# Patient Record
Sex: FEMALE | Race: OTHER | HISPANIC OR LATINO | URBAN - METROPOLITAN AREA
[De-identification: names, ages, dates, MRNs, and addresses within clinical notes are randomized per-mention and may not be internally consistent; named-entity substitution may affect disease eponyms.]

---

## 2017-11-02 ENCOUNTER — EMERGENCY (EMERGENCY)
Facility: HOSPITAL | Age: 47
LOS: 0 days | Discharge: HOME | End: 2017-11-02
Attending: EMERGENCY MEDICINE
Payer: COMMERCIAL

## 2017-11-02 VITALS
SYSTOLIC BLOOD PRESSURE: 124 MMHG | RESPIRATION RATE: 20 BRPM | HEART RATE: 85 BPM | DIASTOLIC BLOOD PRESSURE: 77 MMHG | OXYGEN SATURATION: 99 % | WEIGHT: 184.09 LBS | TEMPERATURE: 98 F | HEIGHT: 67 IN

## 2017-11-02 DIAGNOSIS — N83.201 UNSPECIFIED OVARIAN CYST, RIGHT SIDE: ICD-10-CM

## 2017-11-02 DIAGNOSIS — Z90.721 ACQUIRED ABSENCE OF OVARIES, UNILATERAL: Chronic | ICD-10-CM

## 2017-11-02 DIAGNOSIS — R10.9 UNSPECIFIED ABDOMINAL PAIN: ICD-10-CM

## 2017-11-02 LAB
ALBUMIN SERPL ELPH-MCNC: 3.7 G/DL — SIGNIFICANT CHANGE UP (ref 3.3–5)
ALP SERPL-CCNC: 77 U/L — SIGNIFICANT CHANGE UP (ref 40–120)
ALT FLD-CCNC: 27 U/L — SIGNIFICANT CHANGE UP (ref 12–78)
ANION GAP SERPL CALC-SCNC: 7 MMOL/L — SIGNIFICANT CHANGE UP (ref 5–17)
APPEARANCE UR: CLEAR — SIGNIFICANT CHANGE UP
AST SERPL-CCNC: 12 U/L — LOW (ref 15–37)
BACTERIA # UR AUTO: ABNORMAL
BASOPHILS # BLD AUTO: 0.1 K/UL — SIGNIFICANT CHANGE UP (ref 0–0.2)
BASOPHILS NFR BLD AUTO: 0.9 % — SIGNIFICANT CHANGE UP (ref 0–2)
BILIRUB SERPL-MCNC: 0.5 MG/DL — SIGNIFICANT CHANGE UP (ref 0.2–1.2)
BILIRUB UR-MCNC: NEGATIVE — SIGNIFICANT CHANGE UP
BUN SERPL-MCNC: 14 MG/DL — SIGNIFICANT CHANGE UP (ref 7–23)
CALCIUM SERPL-MCNC: 8.6 MG/DL — SIGNIFICANT CHANGE UP (ref 8.5–10.1)
CHLORIDE SERPL-SCNC: 106 MMOL/L — SIGNIFICANT CHANGE UP (ref 96–108)
CO2 SERPL-SCNC: 27 MMOL/L — SIGNIFICANT CHANGE UP (ref 22–31)
COLOR SPEC: YELLOW — SIGNIFICANT CHANGE UP
CREAT SERPL-MCNC: 0.97 MG/DL — SIGNIFICANT CHANGE UP (ref 0.5–1.3)
DIFF PNL FLD: ABNORMAL
EOSINOPHIL # BLD AUTO: 0.1 K/UL — SIGNIFICANT CHANGE UP (ref 0–0.5)
EOSINOPHIL NFR BLD AUTO: 1.1 % — SIGNIFICANT CHANGE UP (ref 0–6)
EPI CELLS # UR: SIGNIFICANT CHANGE UP
GLUCOSE SERPL-MCNC: 105 MG/DL — HIGH (ref 70–99)
GLUCOSE UR QL: NEGATIVE MG/DL — SIGNIFICANT CHANGE UP
HCG SERPL-ACNC: <1 MIU/ML — SIGNIFICANT CHANGE UP
HCT VFR BLD CALC: 38 % — SIGNIFICANT CHANGE UP (ref 34.5–45)
HGB BLD-MCNC: 12.8 G/DL — SIGNIFICANT CHANGE UP (ref 11.5–15.5)
KETONES UR-MCNC: NEGATIVE — SIGNIFICANT CHANGE UP
LEUKOCYTE ESTERASE UR-ACNC: ABNORMAL
LYMPHOCYTES # BLD AUTO: 2.3 K/UL — SIGNIFICANT CHANGE UP (ref 1–3.3)
LYMPHOCYTES # BLD AUTO: 22.1 % — SIGNIFICANT CHANGE UP (ref 13–44)
MCHC RBC-ENTMCNC: 30 PG — SIGNIFICANT CHANGE UP (ref 27–34)
MCHC RBC-ENTMCNC: 33.7 GM/DL — SIGNIFICANT CHANGE UP (ref 32–36)
MCV RBC AUTO: 89.1 FL — SIGNIFICANT CHANGE UP (ref 80–100)
MONOCYTES # BLD AUTO: 0.7 K/UL — SIGNIFICANT CHANGE UP (ref 0–0.9)
MONOCYTES NFR BLD AUTO: 7 % — SIGNIFICANT CHANGE UP (ref 2–14)
NEUTROPHILS # BLD AUTO: 7.1 K/UL — SIGNIFICANT CHANGE UP (ref 1.8–7.4)
NEUTROPHILS NFR BLD AUTO: 69 % — SIGNIFICANT CHANGE UP (ref 43–77)
NITRITE UR-MCNC: NEGATIVE — SIGNIFICANT CHANGE UP
PH UR: 7 — SIGNIFICANT CHANGE UP (ref 5–8)
PLATELET # BLD AUTO: 210 K/UL — SIGNIFICANT CHANGE UP (ref 150–400)
POTASSIUM SERPL-MCNC: 3.8 MMOL/L — SIGNIFICANT CHANGE UP (ref 3.5–5.3)
POTASSIUM SERPL-SCNC: 3.8 MMOL/L — SIGNIFICANT CHANGE UP (ref 3.5–5.3)
PROT SERPL-MCNC: 7.6 GM/DL — SIGNIFICANT CHANGE UP (ref 6–8.3)
PROT UR-MCNC: NEGATIVE MG/DL — SIGNIFICANT CHANGE UP
RBC # BLD: 4.26 M/UL — SIGNIFICANT CHANGE UP (ref 3.8–5.2)
RBC # FLD: 13.2 % — SIGNIFICANT CHANGE UP (ref 11–15)
SODIUM SERPL-SCNC: 140 MMOL/L — SIGNIFICANT CHANGE UP (ref 135–145)
SP GR SPEC: 1.01 — SIGNIFICANT CHANGE UP (ref 1.01–1.02)
UROBILINOGEN FLD QL: NEGATIVE MG/DL — SIGNIFICANT CHANGE UP
WBC # BLD: 10.4 K/UL — SIGNIFICANT CHANGE UP (ref 3.8–10.5)
WBC # FLD AUTO: 10.4 K/UL — SIGNIFICANT CHANGE UP (ref 3.8–10.5)
WBC UR QL: SIGNIFICANT CHANGE UP

## 2017-11-02 PROCEDURE — 74177 CT ABD & PELVIS W/CONTRAST: CPT | Mod: 26

## 2017-11-02 PROCEDURE — 76856 US EXAM PELVIC COMPLETE: CPT | Mod: 26

## 2017-11-02 PROCEDURE — 99285 EMERGENCY DEPT VISIT HI MDM: CPT | Mod: 25

## 2017-11-02 RX ORDER — SODIUM CHLORIDE 9 MG/ML
1000 INJECTION INTRAMUSCULAR; INTRAVENOUS; SUBCUTANEOUS ONCE
Qty: 0 | Refills: 0 | Status: COMPLETED | OUTPATIENT
Start: 2017-11-02 | End: 2017-11-02

## 2017-11-02 RX ORDER — KETOROLAC TROMETHAMINE 30 MG/ML
30 SYRINGE (ML) INJECTION ONCE
Qty: 0 | Refills: 0 | Status: DISCONTINUED | OUTPATIENT
Start: 2017-11-02 | End: 2017-11-02

## 2017-11-02 RX ADMIN — Medication 30 MILLIGRAM(S): at 01:30

## 2017-11-02 RX ADMIN — SODIUM CHLORIDE 500 MILLILITER(S): 9 INJECTION INTRAMUSCULAR; INTRAVENOUS; SUBCUTANEOUS at 01:22

## 2017-11-02 NOTE — ED ADULT TRIAGE NOTE - CHIEF COMPLAINT QUOTE
lower back pain radiating down R-leg with lower abd pain since 5pm, denies pain on urination/frequency.  c/o nausea.  denies V/D

## 2017-11-02 NOTE — ED PROVIDER NOTE - PHYSICAL EXAMINATION
GEN: Alert, NAD  HEAD: atraumatic, EOMI, PERRL, normal lids/conjunctiva  ENT: normal hearing, patent oropharynx without erythema/exudate, uvula midline  NECK: +supple, no tenderness/meningismus, +Trachea midline  PULM: Bilateral BS, normal resp effort, no wheeze/stridor/retractions  CV: RRR, no M/R/G, +dist ext pulses  ABD: soft, NT/ND, mild lower abd pain, no rebround  gyn exam - pt deferred  BACK: no CVAT, no midline tenderness  MSK: no edema/erythema/cyanosis  SKIN: no rash  NEURO: AAOx3, no sensory/motor deficits, CN 2-12 intact

## 2017-11-02 NOTE — ED PROVIDER NOTE - PRESENTING SYMPTOMS DETAILS
46F w L oopherectomy coems in w lower abd pain since today after she got up from sitting, mild nausea, no v/d/flankpain/dysuria/discharge, constant, no radiation  ROS: No fever/chills, No photophobia/eye pain/changes in vision, No ear pain/sore throat/dysphagia, No chest pain/palpitations, no SOB/cough/wheeze/stridor, No N/V/D, no dysuria/frequency/discharge, No neck/back pain, no rash, no changes in neurological status/function.

## 2017-11-02 NOTE — ED ADULT NURSE NOTE - OBJECTIVE STATEMENT
Pt states she has RLQ pain since this AM.  Pt c/o nausea without vomiting or diarrhea.  Pain worsens when ambulating.  Bowel soft but tender to touch in RLQ area.

## 2017-11-02 NOTE — ED PROVIDER NOTE - MEDICAL DECISION MAKING DETAILS
pt pain improved. CT/US shows likely ov cyst .pt feels comfortable f/u w ob/gyn. pt appears well and non-toxic. . VSS. Sx have improved during ED stay. No acute events during ED observation period. Precautions given to return to the ED if sx persist or change. Pt expresses understanding and has no further questions. Pt feels comfortable and wishes to be discharged home. Instructed to follow up with PMD in 24 hrs. ob/gyn f/u

## 2017-11-03 LAB
CULTURE RESULTS: SIGNIFICANT CHANGE UP
SPECIMEN SOURCE: SIGNIFICANT CHANGE UP

## 2020-01-16 ENCOUNTER — EMERGENCY (EMERGENCY)
Facility: HOSPITAL | Age: 50
LOS: 0 days | Discharge: HOME | End: 2020-01-16
Attending: EMERGENCY MEDICINE
Payer: MEDICAID

## 2020-01-16 VITALS
TEMPERATURE: 98 F | WEIGHT: 169.09 LBS | DIASTOLIC BLOOD PRESSURE: 90 MMHG | HEIGHT: 67 IN | OXYGEN SATURATION: 99 % | SYSTOLIC BLOOD PRESSURE: 137 MMHG | RESPIRATION RATE: 80 BRPM | HEART RATE: 64 BPM

## 2020-01-16 DIAGNOSIS — R05 COUGH: ICD-10-CM

## 2020-01-16 DIAGNOSIS — J06.9 ACUTE UPPER RESPIRATORY INFECTION, UNSPECIFIED: ICD-10-CM

## 2020-01-16 DIAGNOSIS — Z90.721 ACQUIRED ABSENCE OF OVARIES, UNILATERAL: Chronic | ICD-10-CM

## 2020-01-16 PROCEDURE — 71045 X-RAY EXAM CHEST 1 VIEW: CPT | Mod: 26

## 2020-01-16 PROCEDURE — 99283 EMERGENCY DEPT VISIT LOW MDM: CPT

## 2020-01-16 RX ADMIN — Medication 100 MILLIGRAM(S): at 16:12

## 2020-01-16 NOTE — ED PROVIDER NOTE - PATIENT PORTAL LINK FT
You can access the FollowMyHealth Patient Portal offered by [facility] by registering at the following website: [email or web address]. By joining [employer]’s FollowMyHealth portal, you will also be able to view your health information using other applications (apps) compatible with our system.

## 2020-01-16 NOTE — ED PROVIDER NOTE - CLINICAL SUMMARY MEDICAL DECISION MAKING FREE TEXT BOX
DDX:: bronchitis, rule out pneumonia, chest wall pain, no risk factors for ACS, Heart score- 0    PLAN: Chest XRAY, EKG, test tessalon perles, and reassess.

## 2020-01-16 NOTE — ED PROVIDER NOTE - OBJECTIVE STATEMENT
48 y/o F with no pertinent pmhx presents with non productive Cough since last night, and CP with onset today. Pt reports n/v, sore throat, dyspnea. weakness, and HA. Pt denies fevers, rhinorrhea,  abdominal pain, or general myalgia.

## 2020-01-16 NOTE — ED ADULT NURSE NOTE - NSIMPLEMENTINTERV_GEN_ALL_ED
Implemented All Universal Safety Interventions:  [place]eche to call system. Call bell, personal items and telephone within reach. Instruct patient to call for assistance. Room bathroom lighting operational. Non-slip footwear when patient is off stretcher. Physically safe environment: no spills, clutter or unnecessary equipment. Stretcher in lowest position, wheels locked, appropriate side rails in place.

## 2020-01-16 NOTE — ED ADULT NURSE NOTE - OBJECTIVE STATEMENT
c/o cough x 1 week worse over past 8days with chest discomfort, rates coughing causes 3/10 chest discomfort

## 2020-01-20 ENCOUNTER — EMERGENCY (EMERGENCY)
Facility: HOSPITAL | Age: 50
LOS: 0 days | Discharge: HOME | End: 2020-01-20
Payer: MEDICAID

## 2020-01-20 VITALS
HEART RATE: 90 BPM | TEMPERATURE: 100 F | DIASTOLIC BLOOD PRESSURE: 68 MMHG | SYSTOLIC BLOOD PRESSURE: 130 MMHG | WEIGHT: 158.07 LBS | OXYGEN SATURATION: 100 % | RESPIRATION RATE: 17 BRPM | HEIGHT: 67 IN

## 2020-01-20 DIAGNOSIS — J40 BRONCHITIS, NOT SPECIFIED AS ACUTE OR CHRONIC: ICD-10-CM

## 2020-01-20 DIAGNOSIS — R50.9 FEVER, UNSPECIFIED: ICD-10-CM

## 2020-01-20 DIAGNOSIS — Z90.721 ACQUIRED ABSENCE OF OVARIES, UNILATERAL: Chronic | ICD-10-CM

## 2020-01-20 DIAGNOSIS — R05 COUGH: ICD-10-CM

## 2020-01-20 PROCEDURE — 99283 EMERGENCY DEPT VISIT LOW MDM: CPT

## 2020-01-20 RX ORDER — AZITHROMYCIN 500 MG/1
500 TABLET, FILM COATED ORAL ONCE
Refills: 0 | Status: COMPLETED | OUTPATIENT
Start: 2020-01-20 | End: 2020-01-20

## 2020-01-20 RX ORDER — AZITHROMYCIN 500 MG/1
1 TABLET, FILM COATED ORAL
Qty: 3 | Refills: 0
Start: 2020-01-20 | End: 2020-01-22

## 2020-01-20 RX ADMIN — AZITHROMYCIN 500 MILLIGRAM(S): 500 TABLET, FILM COATED ORAL at 22:00

## 2020-01-20 NOTE — ED ADULT NURSE NOTE - CAS EDN DISCHARGE ASSESSMENT
Dressing clean and dry/Alert and oriented to person, place and time/Awake/No adverse reaction to first time med in ED

## 2020-01-22 NOTE — ED PROVIDER NOTE - OBJECTIVE STATEMENT
48 yo female with no PMHx presenting to ED with complaints of cough for one week. Patient was seen in ED and given Tessalon perles and CXR last week but states cough is getting worse. Endorses waxing and waning fevers and malaise. Patient denies recent travel, or sick contacts.

## 2020-01-22 NOTE — ED PROVIDER NOTE - PATIENT PORTAL LINK FT
You can access the FollowMyHealth Patient Portal offered by [facility] by registering at the following website: [email or web address]. By joining [employer]’s FollowMyHealth portal, you will also be able to view your health information using other applications (apps) compatible with our system.

## 2020-01-22 NOTE — ED PROVIDER NOTE - PHYSICAL EXAMINATION
Lungs have scattered ronchi, no wheezing, no increased work of breathing,   Throat is clear no exudates, uvula midline no cervical lymphadenopathy.

## 2020-01-22 NOTE — ED PROVIDER NOTE - CLINICAL SUMMARY MEDICAL DECISION MAKING FREE TEXT BOX
based on exam and history likely bronchitis will not repeat CXR as patient had one 2 days prior, will give ABX.

## 2020-03-22 ENCOUNTER — EMERGENCY (EMERGENCY)
Facility: HOSPITAL | Age: 50
LOS: 0 days | Discharge: HOME | End: 2020-03-23
Attending: EMERGENCY MEDICINE
Payer: MEDICAID

## 2020-03-22 VITALS
RESPIRATION RATE: 20 BRPM | HEIGHT: 67 IN | TEMPERATURE: 102 F | SYSTOLIC BLOOD PRESSURE: 118 MMHG | DIASTOLIC BLOOD PRESSURE: 70 MMHG | HEART RATE: 91 BPM | WEIGHT: 167.99 LBS | OXYGEN SATURATION: 96 %

## 2020-03-22 DIAGNOSIS — B34.9 VIRAL INFECTION, UNSPECIFIED: ICD-10-CM

## 2020-03-22 DIAGNOSIS — R50.9 FEVER, UNSPECIFIED: ICD-10-CM

## 2020-03-22 DIAGNOSIS — R55 SYNCOPE AND COLLAPSE: ICD-10-CM

## 2020-03-22 DIAGNOSIS — Z90.721 ACQUIRED ABSENCE OF OVARIES, UNILATERAL: Chronic | ICD-10-CM

## 2020-03-22 DIAGNOSIS — Z90.721 ACQUIRED ABSENCE OF OVARIES, UNILATERAL: ICD-10-CM

## 2020-03-22 DIAGNOSIS — J02.9 ACUTE PHARYNGITIS, UNSPECIFIED: ICD-10-CM

## 2020-03-22 LAB
ALBUMIN SERPL ELPH-MCNC: 3.6 G/DL — SIGNIFICANT CHANGE UP (ref 3.3–5)
ALP SERPL-CCNC: 90 U/L — SIGNIFICANT CHANGE UP (ref 40–120)
ALT FLD-CCNC: 29 U/L — SIGNIFICANT CHANGE UP (ref 12–78)
ANION GAP SERPL CALC-SCNC: 9 MMOL/L — SIGNIFICANT CHANGE UP (ref 5–17)
APPEARANCE UR: CLEAR — SIGNIFICANT CHANGE UP
AST SERPL-CCNC: 19 U/L — SIGNIFICANT CHANGE UP (ref 15–37)
BACTERIA # UR AUTO: ABNORMAL
BASOPHILS # BLD AUTO: 0.02 K/UL — SIGNIFICANT CHANGE UP (ref 0–0.2)
BASOPHILS NFR BLD AUTO: 0.4 % — SIGNIFICANT CHANGE UP (ref 0–2)
BILIRUB SERPL-MCNC: 0.5 MG/DL — SIGNIFICANT CHANGE UP (ref 0.2–1.2)
BILIRUB UR-MCNC: NEGATIVE — SIGNIFICANT CHANGE UP
BUN SERPL-MCNC: 11 MG/DL — SIGNIFICANT CHANGE UP (ref 7–23)
CALCIUM SERPL-MCNC: 8.6 MG/DL — SIGNIFICANT CHANGE UP (ref 8.5–10.1)
CHLORIDE SERPL-SCNC: 107 MMOL/L — SIGNIFICANT CHANGE UP (ref 96–108)
CO2 SERPL-SCNC: 22 MMOL/L — SIGNIFICANT CHANGE UP (ref 22–31)
COLOR SPEC: YELLOW — SIGNIFICANT CHANGE UP
CREAT SERPL-MCNC: 0.86 MG/DL — SIGNIFICANT CHANGE UP (ref 0.5–1.3)
DIFF PNL FLD: ABNORMAL
EOSINOPHIL # BLD AUTO: 0 K/UL — SIGNIFICANT CHANGE UP (ref 0–0.5)
EOSINOPHIL NFR BLD AUTO: 0 % — SIGNIFICANT CHANGE UP (ref 0–6)
EPI CELLS # UR: SIGNIFICANT CHANGE UP
FLU A RESULT: SIGNIFICANT CHANGE UP
FLU A RESULT: SIGNIFICANT CHANGE UP
FLUAV AG NPH QL: SIGNIFICANT CHANGE UP
FLUBV AG NPH QL: SIGNIFICANT CHANGE UP
GLUCOSE SERPL-MCNC: 103 MG/DL — HIGH (ref 70–99)
GLUCOSE UR QL: NEGATIVE MG/DL — SIGNIFICANT CHANGE UP
HCG SERPL-ACNC: <1 MIU/ML — SIGNIFICANT CHANGE UP
HCT VFR BLD CALC: 38 % — SIGNIFICANT CHANGE UP (ref 34.5–45)
HGB BLD-MCNC: 12.4 G/DL — SIGNIFICANT CHANGE UP (ref 11.5–15.5)
IMM GRANULOCYTES NFR BLD AUTO: 0.2 % — SIGNIFICANT CHANGE UP (ref 0–1.5)
KETONES UR-MCNC: NEGATIVE — SIGNIFICANT CHANGE UP
LACTATE SERPL-SCNC: 1.2 MMOL/L — SIGNIFICANT CHANGE UP (ref 0.7–2)
LEUKOCYTE ESTERASE UR-ACNC: NEGATIVE — SIGNIFICANT CHANGE UP
LYMPHOCYTES # BLD AUTO: 0.88 K/UL — LOW (ref 1–3.3)
LYMPHOCYTES # BLD AUTO: 16.1 % — SIGNIFICANT CHANGE UP (ref 13–44)
MCHC RBC-ENTMCNC: 28.6 PG — SIGNIFICANT CHANGE UP (ref 27–34)
MCHC RBC-ENTMCNC: 32.6 GM/DL — SIGNIFICANT CHANGE UP (ref 32–36)
MCV RBC AUTO: 87.6 FL — SIGNIFICANT CHANGE UP (ref 80–100)
MONOCYTES # BLD AUTO: 0.28 K/UL — SIGNIFICANT CHANGE UP (ref 0–0.9)
MONOCYTES NFR BLD AUTO: 5.1 % — SIGNIFICANT CHANGE UP (ref 2–14)
NEUTROPHILS # BLD AUTO: 4.27 K/UL — SIGNIFICANT CHANGE UP (ref 1.8–7.4)
NEUTROPHILS NFR BLD AUTO: 78.2 % — HIGH (ref 43–77)
NITRITE UR-MCNC: NEGATIVE — SIGNIFICANT CHANGE UP
NRBC # BLD: 0 /100 WBCS — SIGNIFICANT CHANGE UP (ref 0–0)
PH UR: 6 — SIGNIFICANT CHANGE UP (ref 5–8)
PLATELET # BLD AUTO: 148 K/UL — LOW (ref 150–400)
POTASSIUM SERPL-MCNC: 3.7 MMOL/L — SIGNIFICANT CHANGE UP (ref 3.5–5.3)
POTASSIUM SERPL-SCNC: 3.7 MMOL/L — SIGNIFICANT CHANGE UP (ref 3.5–5.3)
PROT SERPL-MCNC: 7.6 GM/DL — SIGNIFICANT CHANGE UP (ref 6–8.3)
PROT UR-MCNC: NEGATIVE MG/DL — SIGNIFICANT CHANGE UP
RBC # BLD: 4.34 M/UL — SIGNIFICANT CHANGE UP (ref 3.8–5.2)
RBC # FLD: 13.5 % — SIGNIFICANT CHANGE UP (ref 10.3–14.5)
RBC CASTS # UR COMP ASSIST: SIGNIFICANT CHANGE UP /HPF (ref 0–4)
RSV RESULT: SIGNIFICANT CHANGE UP
RSV RNA RESP QL NAA+PROBE: SIGNIFICANT CHANGE UP
SODIUM SERPL-SCNC: 138 MMOL/L — SIGNIFICANT CHANGE UP (ref 135–145)
SP GR SPEC: 1.01 — SIGNIFICANT CHANGE UP (ref 1.01–1.02)
TROPONIN I SERPL-MCNC: <.015 NG/ML — SIGNIFICANT CHANGE UP (ref 0.01–0.04)
UROBILINOGEN FLD QL: NEGATIVE MG/DL — SIGNIFICANT CHANGE UP
WBC # BLD: 5.46 K/UL — SIGNIFICANT CHANGE UP (ref 3.8–10.5)
WBC # FLD AUTO: 5.46 K/UL — SIGNIFICANT CHANGE UP (ref 3.8–10.5)
WBC UR QL: SIGNIFICANT CHANGE UP

## 2020-03-22 PROCEDURE — 70450 CT HEAD/BRAIN W/O DYE: CPT | Mod: 26

## 2020-03-22 PROCEDURE — 71045 X-RAY EXAM CHEST 1 VIEW: CPT | Mod: 26

## 2020-03-22 PROCEDURE — 99285 EMERGENCY DEPT VISIT HI MDM: CPT

## 2020-03-22 RX ORDER — ACETAMINOPHEN 500 MG
975 TABLET ORAL ONCE
Refills: 0 | Status: COMPLETED | OUTPATIENT
Start: 2020-03-22 | End: 2020-03-22

## 2020-03-22 RX ORDER — SODIUM CHLORIDE 9 MG/ML
2000 INJECTION INTRAMUSCULAR; INTRAVENOUS; SUBCUTANEOUS ONCE
Refills: 0 | Status: COMPLETED | OUTPATIENT
Start: 2020-03-22 | End: 2020-03-22

## 2020-03-22 RX ADMIN — SODIUM CHLORIDE 2000 MILLILITER(S): 9 INJECTION INTRAMUSCULAR; INTRAVENOUS; SUBCUTANEOUS at 21:47

## 2020-03-22 RX ADMIN — SODIUM CHLORIDE 2000 MILLILITER(S): 9 INJECTION INTRAMUSCULAR; INTRAVENOUS; SUBCUTANEOUS at 22:47

## 2020-03-22 RX ADMIN — Medication 975 MILLIGRAM(S): at 22:47

## 2020-03-22 RX ADMIN — Medication 975 MILLIGRAM(S): at 21:47

## 2020-03-22 NOTE — ED PROVIDER NOTE - PHYSICAL EXAMINATION
Gen: Alert, NAD, slightly ill appearing  Head: NC, AT, PERRL, EOMI, normal lids/conjunctiva  ENT: normal hearing, patent oropharynx without erythema/exudate, uvula midline  Neck: +supple, no tenderness/meningismus/JVD, +Trachea midline  Pulm: Bilateral BS, normal resp effort, no wheeze/stridor/retractions  CV: RRR, no M/R/G, +dist pulses  Abd: soft, NT/ND, Negative [place] signs, +BS, no palpable masses  Mskel: no edema/erythema/cyanosis  Skin: no rash, hot/dry  Neuro: AAOx3, no apparent sensory/motor deficits, coordination intact

## 2020-03-22 NOTE — ED PROVIDER NOTE - NS ED ROS FT
+ fever, no chills, No photophobia/eye pain/changes in vision, + runny nose, No ear pain, + sore throat, no dysphagia, No chest pain/palpitations, no SOB, + cough, no wheeze/stridor, No abdominal pain, + N/D, no V, no dysuria/frequency/discharge, + body aches, No neck/back pain, no rash, no changes in neurological status/function.  + HA

## 2020-03-22 NOTE — ED PROVIDER NOTE - OBJECTIVE STATEMENT
50 y/o F with no prior medical hx is coming in for brief syncopal episode while in bed today. Pt has been having fever, body aches, sore throat, N/D, and started having rhinorrhea today. Pt reports mild cough, and mild dyspnea, and a global HA, rated 10/10 for x4 days. Pt otherwise has no blurry vision, neck stiffness, back pain, CP, or SOB. Patient denies EtOH/tobacco/illicit substance use. 48 y/o F with no prior medical hx is coming in for brief syncopal episode while resting in bed today. Pt reports that she's been having fevers, body aches, sore throat, N/D, and started having rhinorrhea today. Pt reports mild cough, and mild dyspnea, and a global HA, rated 10/10 for x4 days. Pt otherwise has no blurry vision, neck stiffness, back pain, CP, or SOB. Patient denies EtOH/tobacco/illicit substance use.

## 2020-03-22 NOTE — ED PROVIDER NOTE - CLINICAL SUMMARY MEDICAL DECISION MAKING FREE TEXT BOX
Patient with viral resp symptoms for days, brief syncope earlier.  VSS.  Neuro intact.  Looks well after supportive care, nontoxic, no resp distress or hypoxia.  Lab values reviewed, there are no values which require acute intervention.  Serial EKG and trops negative.  CT head wnl.  CXR limited inspiration but no focal pna.  Patient advised to continue rest, fluids, supportive care, d/w patient likely coronavirus, written and verbal explanation re: social distancing.  Also d/w patient cardiology f/u.  Discussed results and outcome of today's visit with the patient.  Patient advised to please follow up with another healthcare provider within the next 24 hours and return to the Emergency Department for worsening symptoms or any other concerns.  Patient advised that their doctor may call  to follow up on the specific results of the tests performed today in the emergency department.   Patient appears well on discharge.

## 2020-03-22 NOTE — ED PROVIDER NOTE - CARE PLAN
Principal Discharge DX:	Viral respiratory infection  Secondary Diagnosis:	Syncope, unspecified syncope type

## 2020-03-22 NOTE — ED PROVIDER NOTE - PATIENT PORTAL LINK FT
You can access the FollowMyHealth Patient Portal offered by [facility] by registering at the following website: [email or web address]. By joining [employer]’s FollowMyHealth portal, you will also be able to view your health information using other applications (apps) compatible with our system.

## 2020-03-22 NOTE — ED ADULT TRIAGE NOTE - BP NONINVASIVE DIASTOLIC (MM HG)
70 Complex Repair And Epidermal Autograft Text: The defect edges were debeveled with a #15 scalpel blade.  The primary defect was closed partially with a complex linear closure.  Given the location of the defect, shape of the defect and the proximity to free margins an epidermal autograft was deemed most appropriate to repair the remaining defect.  The graft was trimmed to fit the size of the remaining defect.  The graft was then placed in the primary defect, oriented appropriately, and sutured into place.

## 2020-03-23 VITALS
TEMPERATURE: 98 F | RESPIRATION RATE: 20 BRPM | HEART RATE: 93 BPM | SYSTOLIC BLOOD PRESSURE: 113 MMHG | OXYGEN SATURATION: 97 % | DIASTOLIC BLOOD PRESSURE: 54 MMHG

## 2020-03-23 LAB
D DIMER BLD IA.RAPID-MCNC: <150 NG/ML DDU — SIGNIFICANT CHANGE UP
TROPONIN I SERPL-MCNC: <.015 NG/ML — SIGNIFICANT CHANGE UP (ref 0.01–0.04)

## 2020-03-23 PROCEDURE — 93010 ELECTROCARDIOGRAM REPORT: CPT

## 2020-03-24 LAB
CULTURE RESULTS: SIGNIFICANT CHANGE UP
SPECIMEN SOURCE: SIGNIFICANT CHANGE UP

## 2020-04-05 ENCOUNTER — EMERGENCY (EMERGENCY)
Facility: HOSPITAL | Age: 50
LOS: 0 days | Discharge: HOME | End: 2020-04-05
Attending: EMERGENCY MEDICINE
Payer: MEDICAID

## 2020-04-05 VITALS
SYSTOLIC BLOOD PRESSURE: 132 MMHG | DIASTOLIC BLOOD PRESSURE: 79 MMHG | HEART RATE: 91 BPM | OXYGEN SATURATION: 100 % | TEMPERATURE: 98 F | RESPIRATION RATE: 18 BRPM | WEIGHT: 149.91 LBS

## 2020-04-05 DIAGNOSIS — R19.7 DIARRHEA, UNSPECIFIED: ICD-10-CM

## 2020-04-05 DIAGNOSIS — M79.10 MYALGIA, UNSPECIFIED SITE: ICD-10-CM

## 2020-04-05 DIAGNOSIS — R10.9 UNSPECIFIED ABDOMINAL PAIN: ICD-10-CM

## 2020-04-05 DIAGNOSIS — R07.9 CHEST PAIN, UNSPECIFIED: ICD-10-CM

## 2020-04-05 DIAGNOSIS — B34.9 VIRAL INFECTION, UNSPECIFIED: ICD-10-CM

## 2020-04-05 DIAGNOSIS — Z90.721 ACQUIRED ABSENCE OF OVARIES, UNILATERAL: Chronic | ICD-10-CM

## 2020-04-05 PROCEDURE — 99284 EMERGENCY DEPT VISIT MOD MDM: CPT

## 2020-04-05 PROCEDURE — 93010 ELECTROCARDIOGRAM REPORT: CPT

## 2020-04-05 NOTE — ED PROVIDER NOTE - CLINICAL SUMMARY MEDICAL DECISION MAKING FREE TEXT BOX
Pt in NAD, VSS.  EKG unremarkable, CXR neg for PNA.  Discussed hydration & to continue isolation precautions at home.  Discussed results and outcome of testing with the patient, given copy as well.  Patient advised to please follow up with their primary care doctor within the next 24 hours and return to the Emergency Department for worsening symptoms or any other concerns.  Patient advised that their doctor may call  to follow up on the specific results of the tests performed today in the emergency department. Pt in NAD, VSS.  EKG unremarkable, pt awaiting CXR, later walked to nursing station & asked for discharge and does not want to wait for XR.  Discussed hydration & to continue isolation precautions at home.  Patient advised to please follow up with their primary care doctor within the next 24 hours and return to the Emergency Department for worsening symptoms or any other concerns.  Patient advised that their doctor may call  to follow up on the specific results of the tests performed today in the emergency department.

## 2020-04-05 NOTE — ED ADULT TRIAGE NOTE - CHIEF COMPLAINT QUOTE
pt c/o CP ongoing for 2 days with diarrhea all day, denies abdominal pain, weight loss 18 pounds in 7 days

## 2020-04-05 NOTE — ED ADULT NURSE NOTE - OBJECTIVE STATEMENT
Received patient in D. AOX4. pt c/o CP ongoing for 2 days with diarrhea all day, denies abdominal pain, weight loss 18 pounds in 7 days

## 2020-04-05 NOTE — ED PROVIDER NOTE - PATIENT PORTAL LINK FT
You can access the FollowMyHealth Patient Portal offered by [facility] by registering at the following website: [email or web address]. By joining [employer]’s FollowMyHealth portal, you will also be able to view your health information using other applications (apps) compatible with our system.

## 2020-04-05 NOTE — ED ADULT NURSE REASSESSMENT NOTE - NS ED NURSE REASSESS COMMENT FT1
Refusing Xray at this time. Discharge instructions provided and verbalizes understanding of medication regimen and follow up care. Educational material provided. Denies any pain at this time.

## 2020-04-05 NOTE — ED PROVIDER NOTE - OBJECTIVE STATEMENT
Pertinent PMH/PSH/FHx/SHx and Review of Systems contained within:    48yo F w no significant PMH presents to ED for eval of chest discomfort.  Pt seen in ED about 2wks ago for syncope & weakness, labs, EKG & imaging neg for acute pathology.  Pt states since then she has had mult episodes of loose stools, nonbloody & states she feels weak.  Pt also reports chest discomfort.  Denies syncope, vomiting, inability to perform ADL.  Pt states no household members are sick.  States she has been taking in PO intake at home.    No fever/chills, No photophobia/eye pain/changes in vision, No ear pain/sore throat/dysphagia, no wheeze/stridor, No abdominal pain, No neck/back pain, no rash, no changes in neurological status/function.

## 2020-04-05 NOTE — ED PROVIDER NOTE - NSFOLLOWUPINSTRUCTIONS_ED_ALL_ED_FT
please presume you have COVID and isolate    for testing please call [phone] or 1 [phone]    take tylenol as needed for fever/pain (do not exceed 4000mg/day)    stay hydrated: water, juice, Gatorade/Pedialyte    return to hospital if you have trouble breathing or feel much worse

## 2020-04-05 NOTE — ED ADULT TRIAGE NOTE - CCCP TRG CHIEF CMPLNT
Detail Level: Detailed chest pain General Sunscreen Counseling: I recommended a broad spectrum sunscreen with a SPF of 30 or higher.  I explained that SPF 30 sunscreens block approximately 97 percent of the sun's harmful rays.  Sunscreens should be applied at least 15 minutes prior to expected sun exposure and then every 2 hours after that as long as sun exposure continues. If swimming or exercising sunscreen should be reapplied every 45 minutes to an hour after getting wet or sweating.  One ounce, or the equivalent of a shot glass full of sunscreen, is adequate to protect the skin not covered by a bathing suit. I also recommended a lip balm with a sunscreen as well. Sun protective clothing can be used in lieu of sunscreen but must be worn the entire time you are exposed to the sun's rays.

## 2020-07-26 ENCOUNTER — EMERGENCY (EMERGENCY)
Facility: HOSPITAL | Age: 50
LOS: 0 days | Discharge: HOME | End: 2020-07-26
Attending: EMERGENCY MEDICINE
Payer: MEDICAID

## 2020-07-26 VITALS
RESPIRATION RATE: 16 BRPM | HEIGHT: 67 IN | OXYGEN SATURATION: 98 % | HEART RATE: 84 BPM | TEMPERATURE: 97 F | SYSTOLIC BLOOD PRESSURE: 119 MMHG | DIASTOLIC BLOOD PRESSURE: 57 MMHG | WEIGHT: 167.99 LBS

## 2020-07-26 VITALS
OXYGEN SATURATION: 100 % | RESPIRATION RATE: 16 BRPM | SYSTOLIC BLOOD PRESSURE: 108 MMHG | TEMPERATURE: 98 F | DIASTOLIC BLOOD PRESSURE: 68 MMHG | HEART RATE: 78 BPM

## 2020-07-26 DIAGNOSIS — R06.02 SHORTNESS OF BREATH: ICD-10-CM

## 2020-07-26 DIAGNOSIS — R10.13 EPIGASTRIC PAIN: ICD-10-CM

## 2020-07-26 DIAGNOSIS — F41.0 PANIC DISORDER [EPISODIC PAROXYSMAL ANXIETY]: ICD-10-CM

## 2020-07-26 DIAGNOSIS — R07.9 CHEST PAIN, UNSPECIFIED: ICD-10-CM

## 2020-07-26 DIAGNOSIS — Z90.721 ACQUIRED ABSENCE OF OVARIES, UNILATERAL: Chronic | ICD-10-CM

## 2020-07-26 LAB
ALBUMIN SERPL ELPH-MCNC: 3.6 G/DL — SIGNIFICANT CHANGE UP (ref 3.3–5)
ALP SERPL-CCNC: 89 U/L — SIGNIFICANT CHANGE UP (ref 40–120)
ALT FLD-CCNC: 25 U/L — SIGNIFICANT CHANGE UP (ref 12–78)
ANION GAP SERPL CALC-SCNC: 4 MMOL/L — LOW (ref 5–17)
APTT BLD: 27.5 SEC — SIGNIFICANT CHANGE UP (ref 27.5–35.5)
AST SERPL-CCNC: 18 U/L — SIGNIFICANT CHANGE UP (ref 15–37)
BASE EXCESS BLDA CALC-SCNC: 1 MMOL/L — SIGNIFICANT CHANGE UP (ref -2–2)
BASOPHILS # BLD AUTO: 0.03 K/UL — SIGNIFICANT CHANGE UP (ref 0–0.2)
BASOPHILS NFR BLD AUTO: 0.4 % — SIGNIFICANT CHANGE UP (ref 0–2)
BILIRUB SERPL-MCNC: 0.5 MG/DL — SIGNIFICANT CHANGE UP (ref 0.2–1.2)
BLOOD GAS COMMENTS: SIGNIFICANT CHANGE UP
BLOOD GAS COMMENTS: SIGNIFICANT CHANGE UP
BLOOD GAS SOURCE: SIGNIFICANT CHANGE UP
BUN SERPL-MCNC: 15 MG/DL — SIGNIFICANT CHANGE UP (ref 7–23)
CALCIUM SERPL-MCNC: 8.5 MG/DL — SIGNIFICANT CHANGE UP (ref 8.5–10.1)
CHLORIDE SERPL-SCNC: 108 MMOL/L — SIGNIFICANT CHANGE UP (ref 96–108)
CO2 SERPL-SCNC: 29 MMOL/L — SIGNIFICANT CHANGE UP (ref 22–31)
CREAT SERPL-MCNC: 0.81 MG/DL — SIGNIFICANT CHANGE UP (ref 0.5–1.3)
D DIMER BLD IA.RAPID-MCNC: <150 NG/ML DDU — SIGNIFICANT CHANGE UP
EOSINOPHIL # BLD AUTO: 0.13 K/UL — SIGNIFICANT CHANGE UP (ref 0–0.5)
EOSINOPHIL NFR BLD AUTO: 1.9 % — SIGNIFICANT CHANGE UP (ref 0–6)
GLUCOSE SERPL-MCNC: 121 MG/DL — HIGH (ref 70–99)
HCG SERPL-ACNC: <1 MIU/ML — SIGNIFICANT CHANGE UP
HCO3 BLDA-SCNC: 25 MMOL/L — SIGNIFICANT CHANGE UP (ref 21–29)
HCT VFR BLD CALC: 38.2 % — SIGNIFICANT CHANGE UP (ref 34.5–45)
HGB BLD-MCNC: 12.8 G/DL — SIGNIFICANT CHANGE UP (ref 11.5–15.5)
HOROWITZ INDEX BLDA+IHG-RTO: 21 — SIGNIFICANT CHANGE UP
IMM GRANULOCYTES NFR BLD AUTO: 0.3 % — SIGNIFICANT CHANGE UP (ref 0–1.5)
INR BLD: 1.1 RATIO — SIGNIFICANT CHANGE UP (ref 0.88–1.16)
LACTATE SERPL-SCNC: 1.1 MMOL/L — SIGNIFICANT CHANGE UP (ref 0.7–2)
LYMPHOCYTES # BLD AUTO: 2.72 K/UL — SIGNIFICANT CHANGE UP (ref 1–3.3)
LYMPHOCYTES # BLD AUTO: 39.5 % — SIGNIFICANT CHANGE UP (ref 13–44)
MCHC RBC-ENTMCNC: 29.1 PG — SIGNIFICANT CHANGE UP (ref 27–34)
MCHC RBC-ENTMCNC: 33.5 GM/DL — SIGNIFICANT CHANGE UP (ref 32–36)
MCV RBC AUTO: 86.8 FL — SIGNIFICANT CHANGE UP (ref 80–100)
MONOCYTES # BLD AUTO: 0.46 K/UL — SIGNIFICANT CHANGE UP (ref 0–0.9)
MONOCYTES NFR BLD AUTO: 6.7 % — SIGNIFICANT CHANGE UP (ref 2–14)
NEUTROPHILS # BLD AUTO: 3.53 K/UL — SIGNIFICANT CHANGE UP (ref 1.8–7.4)
NEUTROPHILS NFR BLD AUTO: 51.2 % — SIGNIFICANT CHANGE UP (ref 43–77)
NRBC # BLD: 0 /100 WBCS — SIGNIFICANT CHANGE UP (ref 0–0)
NT-PROBNP SERPL-SCNC: 45 PG/ML — SIGNIFICANT CHANGE UP (ref 0–125)
PCO2 BLDA: 40 MMHG — SIGNIFICANT CHANGE UP (ref 32–46)
PH BLD: 7.41 — SIGNIFICANT CHANGE UP (ref 7.35–7.45)
PLATELET # BLD AUTO: 211 K/UL — SIGNIFICANT CHANGE UP (ref 150–400)
PO2 BLDA: 88 MMHG — SIGNIFICANT CHANGE UP (ref 74–108)
POTASSIUM SERPL-MCNC: 3.8 MMOL/L — SIGNIFICANT CHANGE UP (ref 3.5–5.3)
POTASSIUM SERPL-SCNC: 3.8 MMOL/L — SIGNIFICANT CHANGE UP (ref 3.5–5.3)
PROT SERPL-MCNC: 7.6 GM/DL — SIGNIFICANT CHANGE UP (ref 6–8.3)
PROTHROM AB SERPL-ACNC: 12.3 SEC — SIGNIFICANT CHANGE UP (ref 10.6–13.6)
RBC # BLD: 4.4 M/UL — SIGNIFICANT CHANGE UP (ref 3.8–5.2)
RBC # FLD: 13.6 % — SIGNIFICANT CHANGE UP (ref 10.3–14.5)
SAO2 % BLDA: 97 % — HIGH (ref 92–96)
SODIUM SERPL-SCNC: 141 MMOL/L — SIGNIFICANT CHANGE UP (ref 135–145)
TROPONIN I SERPL-MCNC: <.015 NG/ML — SIGNIFICANT CHANGE UP (ref 0.01–0.04)
TROPONIN I SERPL-MCNC: <.015 NG/ML — SIGNIFICANT CHANGE UP (ref 0.01–0.04)
WBC # BLD: 6.89 K/UL — SIGNIFICANT CHANGE UP (ref 3.8–10.5)
WBC # FLD AUTO: 6.89 K/UL — SIGNIFICANT CHANGE UP (ref 3.8–10.5)

## 2020-07-26 PROCEDURE — 99285 EMERGENCY DEPT VISIT HI MDM: CPT

## 2020-07-26 PROCEDURE — 71275 CT ANGIOGRAPHY CHEST: CPT | Mod: 26

## 2020-07-26 PROCEDURE — 71045 X-RAY EXAM CHEST 1 VIEW: CPT | Mod: 26

## 2020-07-26 PROCEDURE — 93010 ELECTROCARDIOGRAM REPORT: CPT | Mod: 76

## 2020-07-26 RX ORDER — SODIUM CHLORIDE 9 MG/ML
1000 INJECTION INTRAMUSCULAR; INTRAVENOUS; SUBCUTANEOUS ONCE
Refills: 0 | Status: COMPLETED | OUTPATIENT
Start: 2020-07-26 | End: 2020-07-26

## 2020-07-26 RX ADMIN — SODIUM CHLORIDE 1000 MILLILITER(S): 9 INJECTION INTRAMUSCULAR; INTRAVENOUS; SUBCUTANEOUS at 07:15

## 2020-07-26 RX ADMIN — Medication 1 MILLIGRAM(S): at 03:31

## 2020-07-26 RX ADMIN — SODIUM CHLORIDE 1000 MILLILITER(S): 9 INJECTION INTRAMUSCULAR; INTRAVENOUS; SUBCUTANEOUS at 03:33

## 2020-07-26 NOTE — ED ADULT TRIAGE NOTE - CHIEF COMPLAINT QUOTE
c/o chest pain and SOB all day seen by PCP for same symptoms EKG normal, EKG normal as per EMS. denies SI/HI

## 2020-07-26 NOTE — ED PROVIDER NOTE - EKG ADDITIONAL INFORMATION FREE TEXT
NSR, rate 81, no acute st e/d, no twi, qtc 462 NSR, rate 81, no acute st e/d, nonspecific twi III, qtc 462  Repeat EKG:  sinus rhythm, 1st degree AV block, no acute st e/d, no twi, qtc 447

## 2020-07-26 NOTE — ED PROVIDER NOTE - OBJECTIVE STATEMENT
Pertinent PMH/PSH/FHx/SHx and Review of Systems contained within:  Patient presents to the ED for shortness of breath and chest pain.  Patient states that she has been feeling short of breath all day today, and while sleeping she woke up feeling very short of breath, felt her heart racing, felt epigastric discomfort, felt scared and called 911.  Patient appears VERY anxious in ER with normal respirations and vitals.  She denies h/o anxiety, depression, or panic attacks.  She says that this happened to her before, she had a clean work up by PMD but has not yet followed up with cardiology as she is supposed to.  She denies fever, cough, hemoptysis, leg swelling, use of exogenous estrogen.  Quit smoking 7 years ago.  No known family h/o cardian disease.  Patient had mild COVID sx in March, says that she tested antibody positive a few weeks ago.  Patient denies EtOH/tobacco/illicit substance use.    ROS: No fever/chills, No headache/photophobia/eye pain/changes in vision, No ear pain/sore throat/dysphagia, No cough/wheeze/stridor, No abdominal pain, No N/V/D/melena, no dysuria/frequency/discharge, No neck/back pain, no rash, no changes in neurological status/function.

## 2020-07-26 NOTE — ED PROVIDER NOTE - PATIENT PORTAL LINK FT
You can access the FollowMyHealth Patient Portal offered by [facility] by registering at the following website: [email or web address]. By joining [employer]’s FollowMyHealth portal, you will also be able to view your health information using other applications (apps) compatible with our system.

## 2020-07-26 NOTE — ED PROVIDER NOTE - CLINICAL SUMMARY MEDICAL DECISION MAKING FREE TEXT BOX
Patient with c/o sob and chest pain, appeared extremely anxious during initial evaluation, symptoms calmed down with ativan.  VSS.  Oxygenation 99% on RA. Negative work up, EKG wnl, Lab values reviewed, there are no values which require acute intervention, CTA neg for PE.  Patient pending repeat EKG and trop, cardiology f/u again d/w pt.  Patient signed out to incoming physician Dr. Trevino.  All decisions regarding the progression of care will be made at their discretion. Patient with c/o sob and chest pain, appeared extremely anxious during initial evaluation, symptoms calmed down with ativan, presents as a panic attack.  VSS.  Oxygenation 99% on RA. Negative work up, EKG wnl, Lab values reviewed, there are no values which require acute intervention, CTA neg for PE.  Serial EKG and trop neg, cardiology f/u again d/w pt.  Patient advised to see her PMD, provided with all results, and advised to keep cards follow up even though there is strong impression for panic attack. Discussed results and outcome of today's visit with the patient.  Patient advised to please follow up with another healthcare provider within the next 24 hours and return to the Emergency Department for worsening symptoms or any other concerns.  Patient advised that their doctor may call  to follow up on the specific results of the tests performed today in the emergency department.   Patient appears well on discharge.

## 2020-07-26 NOTE — ED ADULT NURSE NOTE - OBJECTIVE STATEMENT
pt c/o difficulty breathing all day.  occurred once before. pt denies chest pressure at this time.  1 hour ago, "I felt shaking, can't breathe", while she was sleeping, which woke her.  denies birth control

## 2020-07-26 NOTE — ED PROVIDER NOTE - CARE PLAN
Principal Discharge DX:	Shortness of breath  Secondary Diagnosis:	Chest pain, unspecified type  Secondary Diagnosis:	Panic attack

## 2020-07-26 NOTE — ED PROVIDER NOTE - PHYSICAL EXAMINATION
Gen: Alert, anxious, well appearing  Head: NC, AT, PERRL, EOMI, normal lids/conjunctiva  ENT: normal hearing, patent oropharynx without erythema/exudate, uvula midline  Neck: +supple, no tenderness/meningismus/JVD, +Trachea midline  Pulm: Bilateral BS, normal resp effort, no wheeze/stridor/retractions  CV: RRR, no M/R/G, +dist pulses  Abd: soft, NT/ND, Negative [place] signs, +BS, no palpable masses  Mskel: no edema/erythema/cyanosis  Skin: no rash, warm/dry  Neuro: AAOx3, no apparent sensory/motor deficits, coordination intact Gen: Alert, anxious, well appearing  Head: NC, AT, PERRL, EOMI, normal lids/conjunctiva  ENT: normal hearing, patent oropharynx without erythema/exudate, uvula midline  Neck: +supple, no tenderness/meningismus/JVD, +Trachea midline, no enlarged thyroid  Pulm: Bilateral BS, normal resp effort, no wheeze/stridor/retractions  CV: RRR, no M/R/G, +dist pulses  Abd: soft, NT/ND, Negative [place] signs, +BS, no palpable masses  Mskel: no edema/erythema/cyanosis  Skin: no rash, warm/dry  Neuro: AAOx3, no apparent sensory/motor deficits, coordination intact

## 2020-07-26 NOTE — ED PROVIDER NOTE - PROGRESS NOTE DETAILS
Patient feeling better, symptoms resolved, suspect panic attacks.  Clean work up, pending repeat EKG and trop.

## 2020-07-26 NOTE — ED PROVIDER NOTE - CARE PROVIDER_API CALL
Katie Vivas  CARDIOLOGY  [address] [id number]  Phone: [phone]  Fax: [phone]  Follow Up Time: 1-3 Days

## 2020-09-25 NOTE — ED PROVIDER NOTE - CONTEXT
Kamron Tracey (1983) initiated an asynchronous digital communication through [address] [place] [address]. HPI: per patient questionnaire Exam: not applicable Diagnoses and all orders for this visit: 
Diagnoses and all orders for this visit: 1. Costochondritis Other orders -     predniSONE (STERAPRED) 5 mg dose pack; See administration instruction per 5mg dose pack Time: EV2 - 11-20 minutes were spent on the digital evaluation and management of this patient.   
 
 
Nathanael Hermosillo MD 

unknown

## 2020-11-27 NOTE — ED ADULT NURSE NOTE - EXTENSIONS OF SELF_ADULT
Bloodwork shows severely uncontrolled diabetes.  Please schedule virtual visit early next week to discuss further.  SEnding in metformin to start immediately. None

## 2020-12-06 ENCOUNTER — TRANSCRIPTION ENCOUNTER (OUTPATIENT)
Age: 50
End: 2020-12-06

## 2021-02-16 ENCOUNTER — EMERGENCY (EMERGENCY)
Facility: HOSPITAL | Age: 51
LOS: 0 days | Discharge: HOME | End: 2021-02-16
Attending: EMERGENCY MEDICINE
Payer: MEDICAID

## 2021-02-16 VITALS
WEIGHT: 156.97 LBS | TEMPERATURE: 98 F | DIASTOLIC BLOOD PRESSURE: 77 MMHG | OXYGEN SATURATION: 99 % | HEIGHT: 67 IN | RESPIRATION RATE: 18 BRPM | SYSTOLIC BLOOD PRESSURE: 125 MMHG | HEART RATE: 81 BPM

## 2021-02-16 VITALS
RESPIRATION RATE: 18 BRPM | SYSTOLIC BLOOD PRESSURE: 108 MMHG | OXYGEN SATURATION: 99 % | HEART RATE: 78 BPM | DIASTOLIC BLOOD PRESSURE: 51 MMHG | TEMPERATURE: 98 F

## 2021-02-16 DIAGNOSIS — F41.0 PANIC DISORDER [EPISODIC PAROXYSMAL ANXIETY]: ICD-10-CM

## 2021-02-16 DIAGNOSIS — Z90.721 ACQUIRED ABSENCE OF OVARIES, UNILATERAL: Chronic | ICD-10-CM

## 2021-02-16 DIAGNOSIS — R07.9 CHEST PAIN, UNSPECIFIED: ICD-10-CM

## 2021-02-16 LAB
ALBUMIN SERPL ELPH-MCNC: 3.7 G/DL — SIGNIFICANT CHANGE UP (ref 3.3–5)
ALP SERPL-CCNC: 88 U/L — SIGNIFICANT CHANGE UP (ref 40–120)
ALT FLD-CCNC: 19 U/L — SIGNIFICANT CHANGE UP (ref 12–78)
ANION GAP SERPL CALC-SCNC: 7 MMOL/L — SIGNIFICANT CHANGE UP (ref 5–17)
AST SERPL-CCNC: 10 U/L — LOW (ref 15–37)
BASOPHILS # BLD AUTO: 0.03 K/UL — SIGNIFICANT CHANGE UP (ref 0–0.2)
BASOPHILS NFR BLD AUTO: 0.4 % — SIGNIFICANT CHANGE UP (ref 0–2)
BILIRUB SERPL-MCNC: 0.4 MG/DL — SIGNIFICANT CHANGE UP (ref 0.2–1.2)
BUN SERPL-MCNC: 18 MG/DL — SIGNIFICANT CHANGE UP (ref 7–23)
CALCIUM SERPL-MCNC: 8.9 MG/DL — SIGNIFICANT CHANGE UP (ref 8.5–10.1)
CHLORIDE SERPL-SCNC: 113 MMOL/L — HIGH (ref 96–108)
CO2 SERPL-SCNC: 24 MMOL/L — SIGNIFICANT CHANGE UP (ref 22–31)
CREAT SERPL-MCNC: 0.74 MG/DL — SIGNIFICANT CHANGE UP (ref 0.5–1.3)
D DIMER BLD IA.RAPID-MCNC: <150 NG/ML DDU — SIGNIFICANT CHANGE UP
EOSINOPHIL # BLD AUTO: 0.12 K/UL — SIGNIFICANT CHANGE UP (ref 0–0.5)
EOSINOPHIL NFR BLD AUTO: 1.7 % — SIGNIFICANT CHANGE UP (ref 0–6)
GLUCOSE SERPL-MCNC: 113 MG/DL — HIGH (ref 70–99)
HCT VFR BLD CALC: 35.2 % — SIGNIFICANT CHANGE UP (ref 34.5–45)
HGB BLD-MCNC: 11.9 G/DL — SIGNIFICANT CHANGE UP (ref 11.5–15.5)
IMM GRANULOCYTES NFR BLD AUTO: 0.3 % — SIGNIFICANT CHANGE UP (ref 0–1.5)
LYMPHOCYTES # BLD AUTO: 2.66 K/UL — SIGNIFICANT CHANGE UP (ref 1–3.3)
LYMPHOCYTES # BLD AUTO: 38.5 % — SIGNIFICANT CHANGE UP (ref 13–44)
MAGNESIUM SERPL-MCNC: 2.1 MG/DL — SIGNIFICANT CHANGE UP (ref 1.6–2.6)
MCHC RBC-ENTMCNC: 28.7 PG — SIGNIFICANT CHANGE UP (ref 27–34)
MCHC RBC-ENTMCNC: 33.8 GM/DL — SIGNIFICANT CHANGE UP (ref 32–36)
MCV RBC AUTO: 85 FL — SIGNIFICANT CHANGE UP (ref 80–100)
MONOCYTES # BLD AUTO: 0.41 K/UL — SIGNIFICANT CHANGE UP (ref 0–0.9)
MONOCYTES NFR BLD AUTO: 5.9 % — SIGNIFICANT CHANGE UP (ref 2–14)
NEUTROPHILS # BLD AUTO: 3.67 K/UL — SIGNIFICANT CHANGE UP (ref 1.8–7.4)
NEUTROPHILS NFR BLD AUTO: 53.2 % — SIGNIFICANT CHANGE UP (ref 43–77)
NRBC # BLD: 0 /100 WBCS — SIGNIFICANT CHANGE UP (ref 0–0)
PLATELET # BLD AUTO: 231 K/UL — SIGNIFICANT CHANGE UP (ref 150–400)
POTASSIUM SERPL-MCNC: 3.7 MMOL/L — SIGNIFICANT CHANGE UP (ref 3.5–5.3)
POTASSIUM SERPL-SCNC: 3.7 MMOL/L — SIGNIFICANT CHANGE UP (ref 3.5–5.3)
PROT SERPL-MCNC: 7.3 GM/DL — SIGNIFICANT CHANGE UP (ref 6–8.3)
RBC # BLD: 4.14 M/UL — SIGNIFICANT CHANGE UP (ref 3.8–5.2)
RBC # FLD: 13.7 % — SIGNIFICANT CHANGE UP (ref 10.3–14.5)
SODIUM SERPL-SCNC: 144 MMOL/L — SIGNIFICANT CHANGE UP (ref 135–145)
TROPONIN I SERPL-MCNC: <.015 NG/ML — SIGNIFICANT CHANGE UP (ref 0.01–0.04)
WBC # BLD: 6.91 K/UL — SIGNIFICANT CHANGE UP (ref 3.8–10.5)
WBC # FLD AUTO: 6.91 K/UL — SIGNIFICANT CHANGE UP (ref 3.8–10.5)

## 2021-02-16 PROCEDURE — 93010 ELECTROCARDIOGRAM REPORT: CPT

## 2021-02-16 PROCEDURE — 71045 X-RAY EXAM CHEST 1 VIEW: CPT | Mod: 26

## 2021-02-16 PROCEDURE — 99285 EMERGENCY DEPT VISIT HI MDM: CPT

## 2021-02-16 RX ADMIN — Medication 1 MILLIGRAM(S): at 02:06

## 2021-02-16 NOTE — ED PROVIDER NOTE - OBJECTIVE STATEMENT
49yo female with no pertinent pmh presents with sob and funny feeling over her chest. Pt states she was trying to go to sleep when she felt she couldn't get a breath in and worsening sob and cp. CP nonradiating. + nausea. + palpitations. Pt is anxious appearing. HAs had similar before and work up with pmd and was seen by cardiology. pt had similar episode felt to be panic attack in prior visit last year. Pt otherwise c/o feeling just weak today and not herself. No fever, NVD, abd pain, dysuria, body aches.     No fever/chills, No photophobia/eye pain/changes in vision, No ear pain/sore throat/dysphagia, +chest pain/palpitations, +SOB, no cough, no wheeze/stridor, No abdominal pain, No N/V/D, no dysuria/frequency/discharge, No neck/back pain, no rash, no changes in neurological status/function.

## 2021-02-16 NOTE — ED ADULT NURSE NOTE - OBJECTIVE STATEMENT
pt presents to the ED c/o sob and chest pain described as tightness today.  pt. states, "I can't breathe when I go to sleep".  pt last cigarette 1 week ago. pt denies fever/cough/chills/covid.

## 2021-02-16 NOTE — ED PROVIDER NOTE - CLINICAL SUMMARY MEDICAL DECISION MAKING FREE TEXT BOX
will do labs, ekg, ativan and reassess. suspect panic attack, as pt has presented similar and has been worked up before. will do labs, ekg, ativan and reassess. suspect panic attack, as pt has presented similar and has been worked up before.    pt feeling much better, asymptomatic, would like to go home.

## 2021-02-16 NOTE — ED PROVIDER NOTE - PATIENT PORTAL LINK FT
You can access the FollowMyHealth Patient Portal offered by [facility] by registering at the following website: [email or web address]. By joining [employer]’s FollowMyHealth portal, you will also be able to view your health information using other applications (apps) compatible with our system.

## 2021-02-16 NOTE — ED ADULT TRIAGE NOTE - CHIEF COMPLAINT QUOTE
pt c/o weakness and chest pain today.  states, "I can't breathe when I go to sleep",    pt denies fever/cough/chills/covid.  pt last cigarette 1 week ago.

## 2021-02-16 NOTE — ED PROVIDER NOTE - PHYSICAL EXAMINATION
Gen: Alert, Well appearing. + anxious appearing  Head: NC, AT, PERRL, normal lids/conjunctiva   ENT: Bilateral TM WNL, patent oropharynx without erythema/exudate, uvula midline  Neck: supple, no tenderness/meningismus  Pulm: Bilateral clear BS, normal resp effort  CV: RRR, no M/R/G, +dist pulses   Abd: soft, NT/ND, +BS, no guarding/rebound tenderness  Mskel: no edema/erythema/cyanosis   Skin: no rash, no bruising  Neuro: AAOx3, no sensory/motor deficits, CN 2-12 intact

## 2021-03-01 NOTE — ED ADULT NURSE NOTE - PRO INTERPRETER NEED 2
English Nsaids Counseling: NSAID Counseling: I discussed with the patient that NSAIDs should be taken with food. Prolonged use of NSAIDs can result in the development of stomach ulcers.  Patient advised to stop taking NSAIDs if abdominal pain occurs.  The patient verbalized understanding of the proper use and possible adverse effects of NSAIDs.  All of the patient's questions and concerns were addressed.

## 2021-05-27 ENCOUNTER — EMERGENCY (EMERGENCY)
Facility: HOSPITAL | Age: 51
LOS: 1 days | Discharge: HOME | End: 2021-05-27
Attending: STUDENT IN AN ORGANIZED HEALTH CARE EDUCATION/TRAINING PROGRAM
Payer: MEDICAID

## 2021-05-27 VITALS
RESPIRATION RATE: 17 BRPM | HEART RATE: 83 BPM | WEIGHT: 160.94 LBS | OXYGEN SATURATION: 97 % | TEMPERATURE: 98 F | DIASTOLIC BLOOD PRESSURE: 80 MMHG | SYSTOLIC BLOOD PRESSURE: 117 MMHG | HEIGHT: 67 IN

## 2021-05-27 DIAGNOSIS — J06.9 ACUTE UPPER RESPIRATORY INFECTION, UNSPECIFIED: ICD-10-CM

## 2021-05-27 DIAGNOSIS — Z90.721 ACQUIRED ABSENCE OF OVARIES, UNILATERAL: Chronic | ICD-10-CM

## 2021-05-27 DIAGNOSIS — R05 COUGH: ICD-10-CM

## 2021-05-27 DIAGNOSIS — H92.01 OTALGIA, RIGHT EAR: ICD-10-CM

## 2021-05-27 DIAGNOSIS — J02.9 ACUTE PHARYNGITIS, UNSPECIFIED: ICD-10-CM

## 2021-05-27 DIAGNOSIS — Z20.822 CONTACT WITH AND (SUSPECTED) EXPOSURE TO COVID-19: ICD-10-CM

## 2021-05-27 DIAGNOSIS — Z90.721 ACQUIRED ABSENCE OF OVARIES, UNILATERAL: ICD-10-CM

## 2021-05-27 LAB
HCOV PNL SPEC NAA+PROBE: DETECTED
RAPID RVP RESULT: DETECTED
SARS-COV-2 RNA SPEC QL NAA+PROBE: SIGNIFICANT CHANGE UP

## 2021-05-27 PROCEDURE — 99284 EMERGENCY DEPT VISIT MOD MDM: CPT

## 2021-05-27 PROCEDURE — 71045 X-RAY EXAM CHEST 1 VIEW: CPT | Mod: 26

## 2021-05-27 RX ORDER — AZITHROMYCIN 500 MG/1
500 TABLET, FILM COATED ORAL ONCE
Refills: 0 | Status: COMPLETED | OUTPATIENT
Start: 2021-05-27 | End: 2021-05-27

## 2021-05-27 RX ORDER — AZITHROMYCIN 500 MG/1
1 TABLET, FILM COATED ORAL
Qty: 4 | Refills: 0
Start: 2021-05-27 | End: 2021-05-30

## 2021-05-27 RX ADMIN — AZITHROMYCIN 500 MILLIGRAM(S): 500 TABLET, FILM COATED ORAL at 11:56

## 2021-05-27 NOTE — ED ADULT NURSE NOTE - OBJECTIVE STATEMENT
Pt reports dry cough and right ear pain since yesterday. States she also has a lot of anxiety and started feeling like she couldn't breathe r/t the coughing. Denies fever, SOB, CP. Pt has been vaccinated for COVID. Denies PMH. NAD noted at this time.

## 2021-05-27 NOTE — ED PROVIDER NOTE - OBJECTIVE STATEMENT
50 year old female with no past medical history presents today c/o two day h/o cough, sore throat and right ear pain, pt states that her grand daughter is sick and was exposed to her, pt did receive her [employer] covid vaccine in april, pt denies fevers or chills (-) nausea or vomiting (-) diarrhea (-) chest pain +sob today with cough (-) recent travel

## 2021-05-27 NOTE — ED PROVIDER NOTE - PATIENT PORTAL LINK FT
You can access the FollowMyHealth Patient Portal offered by [facility] by registering at the following website: [email or web address]. By joining [employer]’s FollowMyHealth portal, you will also be able to view your health information using other applications (apps) compatible with our system.

## 2021-07-28 ENCOUNTER — EMERGENCY (EMERGENCY)
Facility: HOSPITAL | Age: 51
LOS: 0 days | Discharge: HOME | End: 2021-07-28
Attending: EMERGENCY MEDICINE
Payer: MEDICAID

## 2021-07-28 VITALS
SYSTOLIC BLOOD PRESSURE: 148 MMHG | RESPIRATION RATE: 19 BRPM | HEART RATE: 93 BPM | OXYGEN SATURATION: 98 % | WEIGHT: 164.91 LBS | TEMPERATURE: 98 F | HEIGHT: 67 IN | DIASTOLIC BLOOD PRESSURE: 75 MMHG

## 2021-07-28 VITALS
SYSTOLIC BLOOD PRESSURE: 131 MMHG | DIASTOLIC BLOOD PRESSURE: 72 MMHG | TEMPERATURE: 97 F | RESPIRATION RATE: 18 BRPM | HEART RATE: 90 BPM | OXYGEN SATURATION: 99 %

## 2021-07-28 DIAGNOSIS — R20.2 PARESTHESIA OF SKIN: ICD-10-CM

## 2021-07-28 DIAGNOSIS — Z90.721 ACQUIRED ABSENCE OF OVARIES, UNILATERAL: Chronic | ICD-10-CM

## 2021-07-28 DIAGNOSIS — M79.601 PAIN IN RIGHT ARM: ICD-10-CM

## 2021-07-28 LAB
ALBUMIN SERPL ELPH-MCNC: 3.8 G/DL — SIGNIFICANT CHANGE UP (ref 3.3–5)
ALP SERPL-CCNC: 97 U/L — SIGNIFICANT CHANGE UP (ref 40–120)
ALT FLD-CCNC: 16 U/L — SIGNIFICANT CHANGE UP (ref 12–78)
ANION GAP SERPL CALC-SCNC: 7 MMOL/L — SIGNIFICANT CHANGE UP (ref 5–17)
APPEARANCE UR: CLEAR — SIGNIFICANT CHANGE UP
AST SERPL-CCNC: 16 U/L — SIGNIFICANT CHANGE UP (ref 15–37)
BACTERIA # UR AUTO: ABNORMAL
BASOPHILS # BLD AUTO: 0.04 K/UL — SIGNIFICANT CHANGE UP (ref 0–0.2)
BASOPHILS NFR BLD AUTO: 0.6 % — SIGNIFICANT CHANGE UP (ref 0–2)
BILIRUB SERPL-MCNC: 0.5 MG/DL — SIGNIFICANT CHANGE UP (ref 0.2–1.2)
BILIRUB UR-MCNC: NEGATIVE — SIGNIFICANT CHANGE UP
BUN SERPL-MCNC: 11 MG/DL — SIGNIFICANT CHANGE UP (ref 7–23)
CALCIUM SERPL-MCNC: 8.7 MG/DL — SIGNIFICANT CHANGE UP (ref 8.5–10.1)
CHLORIDE SERPL-SCNC: 110 MMOL/L — HIGH (ref 96–108)
CO2 SERPL-SCNC: 24 MMOL/L — SIGNIFICANT CHANGE UP (ref 22–31)
COLOR SPEC: YELLOW — SIGNIFICANT CHANGE UP
CREAT SERPL-MCNC: 0.73 MG/DL — SIGNIFICANT CHANGE UP (ref 0.5–1.3)
D DIMER BLD IA.RAPID-MCNC: <150 NG/ML DDU — SIGNIFICANT CHANGE UP
DIFF PNL FLD: NEGATIVE — SIGNIFICANT CHANGE UP
EOSINOPHIL # BLD AUTO: 0.03 K/UL — SIGNIFICANT CHANGE UP (ref 0–0.5)
EOSINOPHIL NFR BLD AUTO: 0.5 % — SIGNIFICANT CHANGE UP (ref 0–6)
EPI CELLS # UR: SIGNIFICANT CHANGE UP
GLUCOSE BLDC GLUCOMTR-MCNC: 105 MG/DL — HIGH (ref 70–99)
GLUCOSE SERPL-MCNC: 97 MG/DL — SIGNIFICANT CHANGE UP (ref 70–99)
GLUCOSE UR QL: NEGATIVE MG/DL — SIGNIFICANT CHANGE UP
HCG SERPL-ACNC: <1 MIU/ML — SIGNIFICANT CHANGE UP
HCT VFR BLD CALC: 38.5 % — SIGNIFICANT CHANGE UP (ref 34.5–45)
HGB BLD-MCNC: 12.6 G/DL — SIGNIFICANT CHANGE UP (ref 11.5–15.5)
IMM GRANULOCYTES NFR BLD AUTO: 0.3 % — SIGNIFICANT CHANGE UP (ref 0–1.5)
KETONES UR-MCNC: NEGATIVE — SIGNIFICANT CHANGE UP
LEUKOCYTE ESTERASE UR-ACNC: ABNORMAL
LIDOCAIN IGE QN: 94 U/L — SIGNIFICANT CHANGE UP (ref 73–393)
LYMPHOCYTES # BLD AUTO: 1.64 K/UL — SIGNIFICANT CHANGE UP (ref 1–3.3)
LYMPHOCYTES # BLD AUTO: 26.2 % — SIGNIFICANT CHANGE UP (ref 13–44)
MCHC RBC-ENTMCNC: 28.3 PG — SIGNIFICANT CHANGE UP (ref 27–34)
MCHC RBC-ENTMCNC: 32.7 GM/DL — SIGNIFICANT CHANGE UP (ref 32–36)
MCV RBC AUTO: 86.5 FL — SIGNIFICANT CHANGE UP (ref 80–100)
MONOCYTES # BLD AUTO: 0.31 K/UL — SIGNIFICANT CHANGE UP (ref 0–0.9)
MONOCYTES NFR BLD AUTO: 4.9 % — SIGNIFICANT CHANGE UP (ref 2–14)
NEUTROPHILS # BLD AUTO: 4.23 K/UL — SIGNIFICANT CHANGE UP (ref 1.8–7.4)
NEUTROPHILS NFR BLD AUTO: 67.5 % — SIGNIFICANT CHANGE UP (ref 43–77)
NITRITE UR-MCNC: NEGATIVE — SIGNIFICANT CHANGE UP
NRBC # BLD: 0 /100 WBCS — SIGNIFICANT CHANGE UP (ref 0–0)
NT-PROBNP SERPL-SCNC: 52 PG/ML — SIGNIFICANT CHANGE UP (ref 0–125)
PH UR: 7 — SIGNIFICANT CHANGE UP (ref 5–8)
PLATELET # BLD AUTO: 196 K/UL — SIGNIFICANT CHANGE UP (ref 150–400)
POTASSIUM SERPL-MCNC: 4.1 MMOL/L — SIGNIFICANT CHANGE UP (ref 3.5–5.3)
POTASSIUM SERPL-SCNC: 4.1 MMOL/L — SIGNIFICANT CHANGE UP (ref 3.5–5.3)
PROT SERPL-MCNC: 7.7 GM/DL — SIGNIFICANT CHANGE UP (ref 6–8.3)
PROT UR-MCNC: NEGATIVE MG/DL — SIGNIFICANT CHANGE UP
RBC # BLD: 4.45 M/UL — SIGNIFICANT CHANGE UP (ref 3.8–5.2)
RBC # FLD: 13.7 % — SIGNIFICANT CHANGE UP (ref 10.3–14.5)
RBC CASTS # UR COMP ASSIST: SIGNIFICANT CHANGE UP /HPF (ref 0–4)
SODIUM SERPL-SCNC: 141 MMOL/L — SIGNIFICANT CHANGE UP (ref 135–145)
SP GR SPEC: 1 — LOW (ref 1.01–1.02)
TROPONIN I SERPL-MCNC: <.015 NG/ML — SIGNIFICANT CHANGE UP (ref 0.01–0.04)
UROBILINOGEN FLD QL: NEGATIVE MG/DL — SIGNIFICANT CHANGE UP
WBC # BLD: 6.27 K/UL — SIGNIFICANT CHANGE UP (ref 3.8–10.5)
WBC # FLD AUTO: 6.27 K/UL — SIGNIFICANT CHANGE UP (ref 3.8–10.5)
WBC UR QL: SIGNIFICANT CHANGE UP

## 2021-07-28 PROCEDURE — 70498 CT ANGIOGRAPHY NECK: CPT | Mod: 26,MA

## 2021-07-28 PROCEDURE — 0042T: CPT

## 2021-07-28 PROCEDURE — 93010 ELECTROCARDIOGRAM REPORT: CPT

## 2021-07-28 PROCEDURE — 71045 X-RAY EXAM CHEST 1 VIEW: CPT | Mod: 26

## 2021-07-28 PROCEDURE — 99285 EMERGENCY DEPT VISIT HI MDM: CPT

## 2021-07-28 PROCEDURE — 70496 CT ANGIOGRAPHY HEAD: CPT | Mod: 26,MA

## 2021-07-28 PROCEDURE — 70450 CT HEAD/BRAIN W/O DYE: CPT | Mod: 26,MA,59

## 2021-07-28 RX ORDER — ALPRAZOLAM 0.25 MG
0.5 TABLET ORAL ONCE
Refills: 0 | Status: DISCONTINUED | OUTPATIENT
Start: 2021-07-28 | End: 2021-07-28

## 2021-07-28 RX ADMIN — Medication 0.5 MILLIGRAM(S): at 13:03

## 2021-07-28 NOTE — ED PROVIDER NOTE - OBJECTIVE STATEMENT
Pt is a 49 y/o female with no PMH BIB son for evaluation of burning on right arm and leg for 1 month. Patient reports today she was driving and said to feel worse before telling her son to take her to hospital. Pt denies pain, weakness, slurred speech, facial droop, prior history of CVA, chest pain, SOB, or other complaints. Pt does appear anxious and admits to feeling as such and had similar episode 3 months ago.

## 2021-07-28 NOTE — ED PROVIDER NOTE - PROGRESS NOTE DETAILS
Pt is feeling better. Anxiety relieved with xanax. Labs wnl, CT negative, pt referred to neurology for f/u, and return precautions given. Ready for d/c.

## 2021-07-28 NOTE — ED ADULT NURSE NOTE - OBJECTIVE STATEMENT
51 y/o female AAX4, with no PMH. Presents to the ED with c/c of numbness and weakness in the right arm. pt was driving this morning when the numbness came on. pt denies any chest pain, lightheadedness, dizziness, N/V/D/C, fevers, body aches, chills.  pt states she has no PMH. NKDA. pt had a recent travel, came back from [facility] on monday, 2 days  ago

## 2021-07-28 NOTE — ED PROVIDER NOTE - CARE PROVIDER_API CALL
Jasiel Kumar)  Clinical Neurophysiology; Neurology  [address]  [place] [address]  Phone: [phone]  Fax: [phone]  Follow Up Time: 4-6 Days

## 2021-07-28 NOTE — ED PROVIDER NOTE - PATIENT PORTAL LINK FT
You can access the FollowMyHealth Patient Portal offered by [facility] by registering at the following website: [email or web address]. By joining [employer]’s FollowMyHealth portal, you will also be able to view your health information using other applications (apps) compatible with our system.

## 2021-07-28 NOTE — ED PROVIDER NOTE - CLINICAL SUMMARY MEDICAL DECISION MAKING FREE TEXT BOX
DDX: Panic attack, CVA although NIH SS = 0. Symptoms lasting over 1 month so not in window for TPA or intervention, paresthesias.     Plan for CBC, CMP, CT head, CT perfusion, Troponin, CXR, EKG, anxiolytics, and reassess.

## 2021-07-28 NOTE — ED ADULT TRIAGE NOTE - CHIEF COMPLAINT QUOTE
49 y/o female with no PMH. Presents to the ED with c/c of numbness and weakness in the right arm. pt was driving this morning when the numbness came on. pt denies any chest pain, lightheadedness, dizziness, N/V/D/C.

## 2021-07-28 NOTE — ED ADULT NURSE NOTE - BEFAST ARM SIDE DRIFT
No 65 y/o  male with PMHx of HLD, HTN, atrial-fibrillation on coumadin, Rt. kidney stone, h/o mitral valve replacement with bioprosthetic valve, and h/o lithotripsy of Lt kidney stone, p/w palpitations x 5 days. Pt admitted to telemetry for monitoring for atrial-fibrillation and tachycardia.                             16.6   8.01  )-----------( 227      ( 01 Nov 2019 06:45 )             50.7   11-01    139  |  102  |  18  ----------------------------<  111<H>  4.7   |  22  |  0.86    Ca    9.7      01 Nov 2019 06:45    TPro  7.9  /  Alb  4.3  /  TBili  0.8  /  DBili  x   /  AST  41<H>  /  ALT  29  /  AlkPhos  62  11-01    LIVER FUNCTIONS - ( 01 Nov 2019 06:45 )  Alb: 4.3 g/dL / Pro: 7.9 g/dL / ALK PHOS: 62 u/L / ALT: 29 u/L / AST: 41 u/L / GGT: x 65 y/o  male with PMHx of HLD, HTN, atrial-fibrillation on coumadin, Rt. kidney stone, h/o mitral valve replacement with bioprosthetic valve, and h/o lithotripsy of Lt kidney stone, p/w palpitations x 5 days. Pt admitted to telemetry for monitoring for atrial-fibrillation w/ RVR    EKG- Aflutter @ 129amm ST depressions in II, III, avF, v3-6, TWI I,L                        16.6   8.01  )-----------( 227      ( 01 Nov 2019 06:45 )             50.7   11-01    139  |  102  |  18  ----------------------------<  111<H>  4.7   |  22  |  0.86    Ca    9.7      01 Nov 2019 06:45    TPro  7.9  /  Alb  4.3  /  TBili  0.8  /  DBili  x   /  AST  41<H>  /  ALT  29  /  AlkPhos  62  11-01    LIVER FUNCTIONS - ( 01 Nov 2019 06:45 )  Alb: 4.3 g/dL / Pro: 7.9 g/dL / ALK PHOS: 62 u/L / ALT: 29 u/L / AST: 41 u/L / GGT: x

## 2021-07-28 NOTE — ED ADULT NURSE REASSESSMENT NOTE - NS ED NURSE REASSESS COMMENT FT1
Assuming patient's care for coverage. Report received from Ntaalie OGDEN and patient informed during rounding. Assessment available on [facility]. Will continue to monitor

## 2021-07-30 LAB
CULTURE RESULTS: SIGNIFICANT CHANGE UP
SPECIMEN SOURCE: SIGNIFICANT CHANGE UP

## 2021-10-22 ENCOUNTER — APPOINTMENT (OUTPATIENT)
Dept: NEUROLOGY | Facility: CLINIC | Age: 51
End: 2021-10-22

## 2022-01-15 ENCOUNTER — EMERGENCY (EMERGENCY)
Facility: HOSPITAL | Age: 52
LOS: 0 days | Discharge: HOME | End: 2022-01-15
Attending: EMERGENCY MEDICINE
Payer: SELF-PAY

## 2022-01-15 VITALS
RESPIRATION RATE: 18 BRPM | SYSTOLIC BLOOD PRESSURE: 94 MMHG | TEMPERATURE: 98 F | OXYGEN SATURATION: 98 % | HEART RATE: 76 BPM | DIASTOLIC BLOOD PRESSURE: 60 MMHG

## 2022-01-15 VITALS
TEMPERATURE: 98 F | RESPIRATION RATE: 20 BRPM | HEART RATE: 94 BPM | DIASTOLIC BLOOD PRESSURE: 69 MMHG | HEIGHT: 67 IN | WEIGHT: 158.95 LBS | OXYGEN SATURATION: 97 % | SYSTOLIC BLOOD PRESSURE: 100 MMHG

## 2022-01-15 DIAGNOSIS — R31.9 HEMATURIA, UNSPECIFIED: ICD-10-CM

## 2022-01-15 DIAGNOSIS — N39.0 URINARY TRACT INFECTION, SITE NOT SPECIFIED: ICD-10-CM

## 2022-01-15 DIAGNOSIS — Z90.721 ACQUIRED ABSENCE OF OVARIES, UNILATERAL: Chronic | ICD-10-CM

## 2022-01-15 DIAGNOSIS — M54.9 DORSALGIA, UNSPECIFIED: ICD-10-CM

## 2022-01-15 LAB
ALBUMIN SERPL ELPH-MCNC: 3.3 G/DL — SIGNIFICANT CHANGE UP (ref 3.3–5)
ALP SERPL-CCNC: 85 U/L — SIGNIFICANT CHANGE UP (ref 40–120)
ALT FLD-CCNC: 18 U/L — SIGNIFICANT CHANGE UP (ref 12–78)
ANION GAP SERPL CALC-SCNC: 8 MMOL/L — SIGNIFICANT CHANGE UP (ref 5–17)
APPEARANCE UR: ABNORMAL
AST SERPL-CCNC: 14 U/L — LOW (ref 15–37)
BACTERIA # UR AUTO: ABNORMAL
BASOPHILS # BLD AUTO: 0.03 K/UL — SIGNIFICANT CHANGE UP (ref 0–0.2)
BASOPHILS NFR BLD AUTO: 0.4 % — SIGNIFICANT CHANGE UP (ref 0–2)
BILIRUB SERPL-MCNC: 0.5 MG/DL — SIGNIFICANT CHANGE UP (ref 0.2–1.2)
BILIRUB UR-MCNC: NEGATIVE — SIGNIFICANT CHANGE UP
BUN SERPL-MCNC: 14 MG/DL — SIGNIFICANT CHANGE UP (ref 7–23)
CALCIUM SERPL-MCNC: 8.8 MG/DL — SIGNIFICANT CHANGE UP (ref 8.5–10.1)
CHLORIDE SERPL-SCNC: 106 MMOL/L — SIGNIFICANT CHANGE UP (ref 96–108)
CO2 SERPL-SCNC: 28 MMOL/L — SIGNIFICANT CHANGE UP (ref 22–31)
COLOR SPEC: YELLOW — SIGNIFICANT CHANGE UP
CREAT SERPL-MCNC: 0.74 MG/DL — SIGNIFICANT CHANGE UP (ref 0.5–1.3)
DIFF PNL FLD: ABNORMAL
EOSINOPHIL # BLD AUTO: 0.08 K/UL — SIGNIFICANT CHANGE UP (ref 0–0.5)
EOSINOPHIL NFR BLD AUTO: 1 % — SIGNIFICANT CHANGE UP (ref 0–6)
EPI CELLS # UR: SIGNIFICANT CHANGE UP
GLUCOSE SERPL-MCNC: 108 MG/DL — HIGH (ref 70–99)
GLUCOSE UR QL: NEGATIVE MG/DL — SIGNIFICANT CHANGE UP
HCG SERPL-ACNC: <1 MIU/ML — SIGNIFICANT CHANGE UP
HCT VFR BLD CALC: 35.6 % — SIGNIFICANT CHANGE UP (ref 34.5–45)
HGB BLD-MCNC: 11.7 G/DL — SIGNIFICANT CHANGE UP (ref 11.5–15.5)
IMM GRANULOCYTES NFR BLD AUTO: 0.3 % — SIGNIFICANT CHANGE UP (ref 0–1.5)
KETONES UR-MCNC: NEGATIVE — SIGNIFICANT CHANGE UP
LEUKOCYTE ESTERASE UR-ACNC: ABNORMAL
LYMPHOCYTES # BLD AUTO: 1.65 K/UL — SIGNIFICANT CHANGE UP (ref 1–3.3)
LYMPHOCYTES # BLD AUTO: 21.5 % — SIGNIFICANT CHANGE UP (ref 13–44)
MCHC RBC-ENTMCNC: 28.7 PG — SIGNIFICANT CHANGE UP (ref 27–34)
MCHC RBC-ENTMCNC: 32.9 GM/DL — SIGNIFICANT CHANGE UP (ref 32–36)
MCV RBC AUTO: 87.3 FL — SIGNIFICANT CHANGE UP (ref 80–100)
MONOCYTES # BLD AUTO: 0.58 K/UL — SIGNIFICANT CHANGE UP (ref 0–0.9)
MONOCYTES NFR BLD AUTO: 7.6 % — SIGNIFICANT CHANGE UP (ref 2–14)
NEUTROPHILS # BLD AUTO: 5.31 K/UL — SIGNIFICANT CHANGE UP (ref 1.8–7.4)
NEUTROPHILS NFR BLD AUTO: 69.2 % — SIGNIFICANT CHANGE UP (ref 43–77)
NITRITE UR-MCNC: POSITIVE
NRBC # BLD: 0 /100 WBCS — SIGNIFICANT CHANGE UP (ref 0–0)
PH UR: 6 — SIGNIFICANT CHANGE UP (ref 5–8)
PLATELET # BLD AUTO: 229 K/UL — SIGNIFICANT CHANGE UP (ref 150–400)
POTASSIUM SERPL-MCNC: 3.6 MMOL/L — SIGNIFICANT CHANGE UP (ref 3.5–5.3)
POTASSIUM SERPL-SCNC: 3.6 MMOL/L — SIGNIFICANT CHANGE UP (ref 3.5–5.3)
PROT SERPL-MCNC: 7.4 GM/DL — SIGNIFICANT CHANGE UP (ref 6–8.3)
PROT UR-MCNC: 500 MG/DL
RBC # BLD: 4.08 M/UL — SIGNIFICANT CHANGE UP (ref 3.8–5.2)
RBC # FLD: 13.6 % — SIGNIFICANT CHANGE UP (ref 10.3–14.5)
RBC CASTS # UR COMP ASSIST: ABNORMAL /HPF (ref 0–4)
SODIUM SERPL-SCNC: 142 MMOL/L — SIGNIFICANT CHANGE UP (ref 135–145)
SP GR SPEC: 1.02 — SIGNIFICANT CHANGE UP (ref 1.01–1.02)
UROBILINOGEN FLD QL: NEGATIVE MG/DL — SIGNIFICANT CHANGE UP
WBC # BLD: 7.67 K/UL — SIGNIFICANT CHANGE UP (ref 3.8–10.5)
WBC # FLD AUTO: 7.67 K/UL — SIGNIFICANT CHANGE UP (ref 3.8–10.5)
WBC UR QL: >50

## 2022-01-15 PROCEDURE — 99053 MED SERV 10PM-8AM 24 HR FAC: CPT

## 2022-01-15 PROCEDURE — 99284 EMERGENCY DEPT VISIT MOD MDM: CPT

## 2022-01-15 RX ORDER — CEFDINIR 250 MG/5ML
1 POWDER, FOR SUSPENSION ORAL
Qty: 20 | Refills: 0
Start: 2022-01-15 | End: 2022-01-24

## 2022-01-15 RX ORDER — PHENAZOPYRIDINE HCL 100 MG
2 TABLET ORAL
Qty: 12 | Refills: 0
Start: 2022-01-15 | End: 2022-01-16

## 2022-01-15 RX ORDER — IBUPROFEN 200 MG
1 TABLET ORAL
Qty: 20 | Refills: 0
Start: 2022-01-15 | End: 2022-01-19

## 2022-01-15 RX ORDER — KETOROLAC TROMETHAMINE 30 MG/ML
30 SYRINGE (ML) INJECTION ONCE
Refills: 0 | Status: DISCONTINUED | OUTPATIENT
Start: 2022-01-15 | End: 2022-01-15

## 2022-01-15 RX ORDER — CEFTRIAXONE 500 MG/1
1000 INJECTION, POWDER, FOR SOLUTION INTRAMUSCULAR; INTRAVENOUS ONCE
Refills: 0 | Status: COMPLETED | OUTPATIENT
Start: 2022-01-15 | End: 2022-01-15

## 2022-01-15 RX ADMIN — Medication 30 MILLIGRAM(S): at 04:40

## 2022-01-15 RX ADMIN — Medication 30 MILLIGRAM(S): at 06:17

## 2022-01-15 RX ADMIN — CEFTRIAXONE 100 MILLIGRAM(S): 500 INJECTION, POWDER, FOR SOLUTION INTRAMUSCULAR; INTRAVENOUS at 06:17

## 2022-01-15 NOTE — ED ADULT TRIAGE NOTE - CHIEF COMPLAINT QUOTE
pt c/o pain on urination, hematuria x 2 days and now pain radiating to bilateral back area.  pt took tylenol 2 tabs at midnight.  PMH-hysterectomy

## 2022-01-15 NOTE — ED ADULT NURSE NOTE - OBJECTIVE STATEMENT
Patient presents to ED in bed 12 awake, alert and oriented x4 complaining of b/l lower flank pain since yesterday with dysuria, foul smelling urine for the past 2 days.  no pmh, psh oophectomy, nka  denies fevers, nv

## 2022-01-15 NOTE — ED PROVIDER NOTE - OBJECTIVE STATEMENT
51 year old female with no significant PMH presenting to ED due to hematuria and some dysuria - burning on urination, back pain without any fever/chills no nausea/vomiting.  States symptoms present x 2 days

## 2022-01-15 NOTE — ED PROVIDER NOTE - PATIENT PORTAL LINK FT
You can access the FollowMyHealth Patient Portal offered by [facility] by registering at the following website: [email or web address]. By joining [employer]’s FollowMyHealth portal, you will also be able to view your health information using other applications (apps) compatible with our system.

## 2022-03-13 NOTE — ED ADULT NURSE NOTE - CAS EDN DISCHARGE INTERVENTIONS
"[place] [facility] Surg  [facility] Medicine  Discharge Summary      Patient Name: Santiago Khan  MRN: [id number]  Patient Class: OP- Observation  Admission Date: 3/11/2022  Hospital Length of Stay: 0 days  Discharge Date and Time:  03/13/2022 10:11 AM  Attending Physician: Drew Nichole MD   Discharging Provider: RUBIA StrangeP-C  Primary Care Provider: Kashif Acosta MD      HPI:   Santiago Khan is a 69 y.o. male with a PMHx of CAD, chronic combined systolic/diastolic HFrEF of 45%, CKD, DM II, GERD, gout, HLD, HTN, BRADLEY on CPAP, and h/o prostate cancer who presented to the Emergency Department with c/o left knee pain that has progressively worsened over the past 1 week. No aggravating or alleviating factors. Associated left knee swelling, left knee redness, and chronic BLE edema. Patient states he was diagnosed with cellulitis of that left knee last week and was prescribed clindamycin. Patient reports compliance with antibiotic, but symptoms continue to worsen. Denies any weakness, numbness/tingling, decreased ROM, effusion, drainage, trauma, CP, SOB, ABD pain, N/V, lightheadedness, dizziness, fever or chills. Work-up in the ED showed: Normal WBC and lactic, ESR 38, CRP 10, procalcitonin 0.06. Left knee XR showed soft tissue swelling without acute osseous abnormality, no joint effusion. IV Ancef given in the ED. Hospital Medicine was contacted for admission and patient placed in Observation.       * No surgery found *      Hospital Course:   Patient kept on obs for L knee cellulitis under the care of hospital medicine. He was started in IV ancef.  03/12: CT knee shows Infected bursa superficial to the patella measuring 4.8 x 1.3 x 7 cm.  No fluid in the joint capsule can be identified. Discussed with Dr. Bruner, she will see pt and plan on aspiration with cx of fluid. WBC decreased overnight. Continue PRN analgesia.  03/13: Patient verbally abusive to staff throughout entire stay, even telling NP to "get " "the F** out of my room and send in a doctor". He made lunging motions at staff insinuating he would physically harm them. He has raised his voice and used profanity towards nurse, CNA, phlebotomy, and providers. Rx for augmentin and doxycycline X 14 days printed. He will f/u OP with PCP and ortho surgery. Patient was seen and examined today and deemed stable for discharge home.       Goals of Care Treatment Preferences:  Code Status: Full Code      Consults:   Consults (From admission, onward)        Status Ordering Provider     Pharmacy to dose Vancomycin consult  Once        Provider:  (Not yet assigned)   "And" Linked Group Details    Acknowledged ERNIE SALDANA.     Inpatient consult to Orthopedic Surgery  Once        Provider:  Rhina Bruner MD    Acknowledged ERNIE SALDANA          No new Assessment & Plan notes have been filed under this hospital service since the last note was generated.  Service: Hospital Medicine    Final Active Diagnoses:    Diagnosis Date Noted POA    PRINCIPAL PROBLEM:  Prepatellar bursitis of left knee [M70.42] 03/12/2022 Yes    Morbid obesity with alveolar hypoventilation [E66.2] 03/12/2022 Yes    Cellulitis of left knee [L03.116] 03/11/2022 Yes    Primary hypertension [I10] 03/11/2022 Yes     Chronic    Chronic combined systolic and diastolic congestive heart failure [I50.42] 07/09/2020 Yes     Chronic    Coronary artery disease of native artery of native heart with stable angina pectoris [I25.118] 06/25/2020 Yes     Chronic    Type 2 diabetes mellitus, without long-term current use of insulin [E11.9]  Yes     Chronic    Chronic kidney disease, stage 3 [N18.30]  Yes     Chronic      Problems Resolved During this Admission:       Discharged Condition: stable    Disposition: Home or Self Care    Follow Up:   Follow-up Information     Kashif Acosta MD Follow up in 3 day(s).    Specialty: Internal Medicine  Why: hospital follow up  Contact information:  [address]  [address]  [place] [address]  [phone]             Rhina Bruner MD Follow up.    Specialty: Orthopedic Surgery  Why: hospital follow up  Contact information:  [address] [place] [address] [address] [place] [address]  [phone]                       Patient Instructions:      Diet diabetic     Diet Cardiac     Notify your health care provider if you experience any of the following:  persistent dizziness, light-headedness, or visual disturbances     Notify your health care provider if you experience any of the following:  worsening rash     Notify your health care provider if you experience any of the following:  severe persistent headache     Notify your health care provider if you experience any of the following:  difficulty breathing or increased cough     Notify your health care provider if you experience any of the following:  redness, tenderness, or signs of infection (pain, swelling, redness, odor or green/yellow discharge around incision site)     Notify your health care provider if you experience any of the following:  severe uncontrolled pain     Notify your health care provider if you experience any of the following:  persistent nausea and vomiting or diarrhea     Notify your health care provider if you experience any of the following:  temperature >100.4     Notify your health care provider if you experience any of the following:  increased confusion or weakness     Activity as tolerated       Significant Diagnostic Studies: Labs:   BMP:   Recent Labs   Lab 03/11/22  1626 03/12/22  0530 03/13/22  0853   * 108  --     140  --    K 4.2 3.6  --    CL 96 96  --    CO2 32* 31*  --    BUN 17 18  --    CREATININE 1.8* 1.6* 1.7*   CALCIUM 9.6 9.4  --    , CMP   Recent Labs   Lab 03/11/22  1626 03/12/22  0530 03/13/22  0853    140  --    K 4.2 3.6  --    CL 96 96  --    CO2 32* 31*  --    * 108  --    BUN 17 18  --    CREATININE 1.8* 1.6* 1.7*   CALCIUM 9.6 9.4  --    PROT 7.7   --   --    ALBUMIN 3.7  --   --    BILITOT 0.6  --   --    ALKPHOS 91  --   --    AST 27  --   --    ALT 26  --   --    ANIONGAP 9 13  --    ESTGFRAFRICA 43* 50* 46*   EGFRNONAA 37* 43* 40*   , CBC   Recent Labs   Lab 03/11/22  1626 03/12/22  0530 03/13/22  0854   WBC 9.54 9.74 9.73   HGB 12.4* 12.7* 13.0*   HCT 38.4* 39.2* 40.0    201 204    and All labs within the past 24 hours have been reviewed    Pending Diagnostic Studies:     Procedure Component Value Units Date/Time    Magnesium [[id number]] Collected: 03/13/22 0956    Order Status: Sent Lab Status: In process Updated: 03/13/22 0956    Specimen: Blood          Medications:  Reconciled Home Medications:      Medication List      START taking these medications    amoxicillin-clavulanate 875-125mg 875-125 mg per tablet  Commonly known as: AUGMENTIN  Take 1 tablet by mouth 2 (two) times daily. for 14 days     doxycycline 100 MG capsule  Commonly known as: MONODOX  Take 1 capsule (100 mg total) by mouth 2 (two) times daily. for 14 days        CHANGE how you take these medications    torsemide 20 MG Tab  Commonly known as: DEMADEX  Take 1 tablet (20 mg total) by mouth once daily.  What changed:   · how much to take  · when to take this        CONTINUE taking these medications    albuterol 90 mcg/actuation inhaler  Commonly known as: PROVENTIL/VENTOLIN HFA  Inhale 2 puffs into the lungs every 4 (four) hours as needed for Wheezing. Rescue     allopurinoL 300 MG tablet  Commonly known as: ZYLOPRIM  TAKE 1 TABLET(300 MG) BY MOUTH EVERY DAY     aspirin 81 MG EC tablet  Commonly known as: ECOTRIN  Take 81 mg by mouth once daily.     baclofen 10 MG tablet  Commonly known as: LIORESAL  Take 1 tablet by mouth every evening.     bisoprolol 5 MG tablet  Commonly known as: ZEBETA  TAKE 1/2 TABLET BY MOUTH ONCE DAILY     colchicine 0.6 mg tablet  Commonly known as: COLCRYS  TAKE 1 TABLET(0.6 MG) BY MOUTH DAILY AS NEEDED     fluticasone propionate 50 mcg/actuation nasal  spray  Commonly known as: FLONASE  SHAKE LIQUID AND USE 2 SPRAYS(100 MCG) IN EACH NOSTRIL EVERY DAY     glimepiride 1 MG tablet  Commonly known as: AMARYL  TAKE 1 TABLET BY MOUTH ONCE DAILY     HYDROcodone-acetaminophen 7.5-325 mg per tablet  Commonly known as: NORCO  Take 1 tablet by mouth every 4 (four) hours as needed (for chronic pain).     losartan 25 MG tablet  Commonly known as: COZAAR  Take 1 tablet (25 mg total) by mouth once daily.     magnesium oxide 400 mg (241.3 mg magnesium) tablet  Commonly known as: MAG-OX  TAKE 2 TABLETS(800 MG) BY MOUTH TWICE DAILY     metFORMIN 500 MG tablet  Commonly known as: GLUCOPHAGE  Take 1 tablet (500 mg total) by mouth daily with breakfast.     metOLazone 2.5 MG tablet  Commonly known as: ZAROXOLYN  Take 1 tablet (2.5 mg total) by mouth once a week.     multivitamin-minerals-lutein Tab  Take 1 tablet by mouth Daily.     omeprazole 40 MG capsule  Commonly known as: PRILOSEC  TAKE ONE CAPSULE BY MOUTH EVERY DAY     potassium chloride SA 20 MEQ tablet  Commonly known as: K-DUR,KLOR-CON  TAKE 3 TABLETS(60 MEQ) BY MOUTH TWICE DAILY     simvastatin 40 MG tablet  Commonly known as: ZOCOR  TAKE 1 TABLET(40 MG) BY MOUTH EVERY EVENING        STOP taking these medications    clindamycin 300 MG capsule  Commonly known as: CLEOCIN            Indwelling Lines/Drains at time of discharge:   Lines/Drains/Airways     None                 Time spent on the discharge of patient: 45 minutes         JURGEN Strange  Department of Hospital Medicine  [facility] [facility] S[facility]   IV discontinued, cath removed intact

## 2022-03-29 NOTE — ED PROVIDER NOTE - NS HIV RISK FACTOR YES
For Your Information   · If you are in need of a medication refill please use one of the following options. You can expect your medication to be filled within 2-3 business days.   1. Call your pharmacy for all medication refills and renewals.   2. [email or web address]- [email or web address]  3. Call your providers office    · If your provider ordered any imaging for you today. Our pre-scheduling services will be reaching out to you within 2 business days to schedule this. [facility] can be reached by calling [phone]     · If your provider ordered testing today, you will be notified of your test results within 3-5 business days unless specified otherwise. If you have not received your results within 5 business days please call your provider's office.    · You may be receiving a survey.  Please take the time to complete this, as your feedback is very important to us!  We strive to make your experience exceptional and your comments help us with that goal.  We look forward to hearing from you!    · For all future appointments please arrive 15 minutes prior to your scheduled visit.     · Patient Contact Center Business Office: assistance with medical billing & financial inquires [phone]            Medicare Wellness Visit  Plan for Preventive Care    A good way for you to stay healthy is to use preventive care.  Medicare covers many services that can help you stay healthy.* The goal of these services is to find any health problems as quickly as possible. Finding problems early can help make them easier to treat.  Your personal plan below lists the services you may need and when they are due.     Health Maintenance Summary     COVID-19 Vaccine (1)  Overdue - never done    DTaP/Tdap/Td Vaccine (1 - Tdap)  Overdue since 12/14/2005    Shingles Vaccine (1 of 2)  Overdue - never done    Pneumococcal Vaccine 65+ (2 of 4 - PPSV23)  Order placed this encounter    Influenza Vaccine (1)  Overdue - never  done    Depression Screening (Yearly)  Due soon on 3/30/2022    Lung Cancer Screening (Yearly)  Due soon on 4/7/2022    Colorectal Cancer Screen- (Cologuard - Every 3 Years)  Next due on 4/7/2024    Hepatitis C Screening   Completed    Abdominal Aortic Aneurysm (AAA) Screening   Completed    Hepatitis B Vaccine   Aged Out    Meningococcal Vaccine   Aged Out    HPV Vaccine   Aged Out           Preventive Care for Women and Men    Heart Screenings (Cardiovascular):  · Blood tests are used to check your cholesterol, lipid and triglyceride levels. High levels can increase your risk for heart disease and stroke. High levels can be treated with medications, diet and exercise. Lowering your levels can help keep your heart and blood vessels healthy.  Your provider will order these tests if they are needed.    · An ultrasound is done to see if you have an abdominal aortic aneurysm (AAA).  This is an enlargement of one of the main blood vessels that delivers blood to the body.   In the [place], 9,000 deaths are caused by AAA.  You may not even know you have this problem and as many as 1 in 3 people will have a serious problem if it is not treated.  Early diagnosis allows for more effective treatment and cure.  If you have a family history of AAA or are a male age 65-75 who has smoked, you are at higher risk of an AAA.  Your provider can order this test, if needed.    Colorectal Screening:  · There are many tests that are used to check for cancer of your colon and rectum. You and your provider should discuss what test is best for you and when to have it done.  Options include:  · Screening Colonoscopy: exam of the entire colon, seen through a flexible lighted tube.  · Flexible Sigmoidoscopy: exam of the last third (sigmoid portion) of the colon and rectum, seen through a flexible lighted tube.  · Cologuard DNA stool test: a sample of your stool is used to screen for cancer and unseen blood in your stool.  · Fecal Occult  Blood Test: a sample of your stool is studied to find any unseen blood    Flu Shot:  · An immunization that helps to prevent influenza (the flu). You should get this every year. The best time to get the shot is in the fall.    Pneumococcal Shot:  • Vaccines are available that can help prevent pneumococcal disease, which is any type of infection caused by Streptococcus pneumoniae bacteria.   Their use can prevent some cases of pneumonia, meningitis, and sepsis. There are two types of pneumococcal vaccines:   o Conjugate vaccines (PCV-13 or Prevnar 13®) - helps protect against the 13 types of pneumococcal bacteria that are the most common causes of serious infections in children and adults.    o Polysaccharide vaccine (PPSV23 or Dmsqbhiik08®) - helps protect against 23 types of pneumococcal bacteria for patients who are recommended to get it.  These vaccines should be given at least 12 months apart.  A booster is usually not needed.     Hepatitis B Shot:  · An immunization that helps to protect people from getting Hepatitis B. Hepatitis B is a virus that spreads through contact with infected blood or body fluids. Many people with the virus do not have symptoms.  The virus can lead to serious problems, such as liver disease. Some people are at higher risk than others. Your doctor will tell you if you need this shot.     Diabetes Screening:  · A test to measure sugar (glucose) in your blood is called a fasting blood sugar. Fasting means you cannot have food or drink for at least 8 hours before the test. This test can detect diabetes long before you may notice symptoms.    Glaucoma Screening:  · Glaucoma screening is performed by your eye doctor. The test measures the fluid pressure inside your eyes to determine if you have glaucoma.     Hepatitis C Screening:  · A blood test to see if you have the hepatitis C virus.  Hepatitis C attacks the liver and is a major cause of chronic liver disease.  Medicare will cover a  single screening for all adults born between 1945 & 1965, or high risk patients (people who have injected illegal drugs or people who have had blood transfusions).  High risk patients who continue to inject illegal drugs can be screened for Hepatitis C every year.    Smoking and Tobacco-Use Cessation Counseling:  · Tobacco is the single greatest cause of disease and early death in our country today. Medication and counseling together can increase a person’s chance of quitting for good.   · Medicare covers two quitting attempts per year, with four counseling sessions per attempt (eight sessions in a 12 month period)    Preventive Screening tests for Women    Screening Mammograms and Breast Exams:  · An x-ray of your breasts to check for breast cancer before you or your doctor may be able to feel it.  If breast cancer is found early it can usually be treated with success.    Pelvic Exams and Pap Tests:  · An exam to check for cervical and vaginal cancer. A Pap test is a lab test in which cells are taken from your cervix and sent to the lab to look for signs of cervical cancer. If cancer of the cervix is found early, chances for a cure are good. Testing can generally end at age 65, or if a woman has a hysterectomy for a benign condition. Your provider may recommend more frequent testing if certain abnormal results are found.    Bone Mass Measurements:  · A painless x-ray of your bone density to see if you are at risk for a broken bone. Bone density refers to the thickness of bones or how tightly the bone tissue is packed.    Preventive Screening tests for Men    Prostate Screening:  · Should you have a prostate cancer test (PSA)?  It is up to you to decide if you want a prostate cancer test. Talk to your clinician to find out if the test is right for you.  Things for you to consider and talk about should include:  · Benefits and harms of the test  · Your family history  · How your race/ethnicity may influence the  test  · If the test may impact other medical conditions you have  · Your values on screenings and treatments    *Medicare pays for many preventive services to keep you healthy. For some of these services, you might have to pay a deductible, coinsurance, and / or copayment.  The amounts vary depending on the type of services you need and the kind of Medicare health plan you have.    For further details on screenings offered by Medicare please visit: https://www.medicare.gov/coverage/preventive-screening-services    Declined

## 2022-09-13 NOTE — ED PROVIDER NOTE - CARDIAC, MLM
Normal rate, regular rhythm.  Heart sounds S1, S2.  No murmurs, rubs or gallops.
Nondisplaced fracture of fifth right metatarsal bone

## 2022-09-29 ENCOUNTER — APPOINTMENT (OUTPATIENT)
Dept: INTERNAL MEDICINE | Facility: CLINIC | Age: 52
End: 2022-09-29

## 2022-10-03 ENCOUNTER — APPOINTMENT (OUTPATIENT)
Dept: INTERNAL MEDICINE | Facility: CLINIC | Age: 52
End: 2022-10-03

## 2022-10-03 VITALS
HEART RATE: 91 BPM | DIASTOLIC BLOOD PRESSURE: 67 MMHG | SYSTOLIC BLOOD PRESSURE: 103 MMHG | OXYGEN SATURATION: 98 % | WEIGHT: 165 LBS | BODY MASS INDEX: 25.9 KG/M2 | HEIGHT: 67 IN

## 2022-10-03 DIAGNOSIS — Z78.9 OTHER SPECIFIED HEALTH STATUS: ICD-10-CM

## 2022-10-03 DIAGNOSIS — Z83.3 FAMILY HISTORY OF DIABETES MELLITUS: ICD-10-CM

## 2022-10-03 DIAGNOSIS — Z83.49 FAMILY HISTORY OF OTHER ENDOCRINE, NUTRITIONAL AND METABOLIC DISEASES: ICD-10-CM

## 2022-10-03 DIAGNOSIS — M25.50 PAIN IN UNSPECIFIED JOINT: ICD-10-CM

## 2022-10-03 DIAGNOSIS — F17.200 NICOTINE DEPENDENCE, UNSPECIFIED, UNCOMPLICATED: ICD-10-CM

## 2022-10-03 PROCEDURE — 36415 COLL VENOUS BLD VENIPUNCTURE: CPT

## 2022-10-03 PROCEDURE — 99214 OFFICE O/P EST MOD 30 MIN: CPT | Mod: 25

## 2022-10-03 NOTE — HISTORY OF PRESENT ILLNESS
[FreeTextEntry8] : Ms. PAOLA NEUMANN 51 year  female  with a PMH Hyperlipidemia, vitamin d defieciency  present to the office due to a headache. As per patient  for the past 2-3 mo has a headache almost every day. Pain relieves when she take 2-3 tylenol , feels nauseous. Denies visual disturbances, vomitting. Noise, light aggravates her symptoms Currently has a slight headache.. Patient mentioned that on 04/2022 was in [facility], did a mammogram after that removed an old breast implant and put the new one.

## 2022-10-03 NOTE — PLAN
[FreeTextEntry1] : F/u exam is performed. Recommend  to do a blood test today, further management will depend on the blood test results. \par Referred patient to see neurologist(neurology office was contacted)\par Smoking cessation is adviced\par For a joint pain, recommend  to take tylenol prn, wear  wrist brace for a CTS, f/u with rheumatologist\par  RTC to f/u in 2 wks. Needs to do fasting BG to f/u on hyperlipidemia.  Patient is verbalized understanding\par 
11

## 2022-10-04 LAB
25(OH)D3 SERPL-MCNC: 24.3 NG/ML
CRP SERPL-MCNC: 4 MG/L
ERYTHROCYTE [SEDIMENTATION RATE] IN BLOOD BY WESTERGREN METHOD: 22 MM/HR
RHEUMATOID FACT SER QL: <10 IU/ML
TSH SERPL-ACNC: 1.76 UIU/ML

## 2022-10-06 LAB — ANA SER IF-ACNC: NEGATIVE

## 2022-10-13 NOTE — ED ADULT NURSE NOTE - NSFALLRSKHARMRISK_ED_ALL_ED
89 year old F PMH CAD s/p CABG x3 (5/2010), DM type 2, HTN, HLD presenting to the ED from [facility] for SOB and bilateral LE edema admitted for SOB    #SOB  - admit to tele  - patient with SOB, now on 2L NC but not on home o2  - maintain o2 above 88%  - could be COPD exacerbation but given elevated proBNP and lower extremity edema, unsure if patient in new CHF exacerbation  - s/p lasix 40mg IVP x1 in the ED, will continue with lasix 40mg IV daily for now, pending cardio recs  - strict I&Os, daily weights  - follow up echo  - for COPD exacerbation, s/p decadron, will place on prednisone 50mg x5 days  - continue duonebs  - cardio consulted, Dr. Lay    #anemia  - noted to have H/H 8.1, was 10 about 1 month ago  - SOB could also be in setting of anemia but pending further workup, hemodynamically stable  - will get anemia labs in the morning and type and screen  - follow up FOBT  - transfuse <8 or if symptomatic     #CAD s/p CABG  #HTN  - continue home plavix, atorvastatin  - continue losartan and metoprolol 50mg BID    #hypothyroidism  - continue levothyroxine 125mcg daily    #CKD stage 3  - Cr 1.47, at baseline    #DVT ppx  - heparin 5000U TID    discussed and updated son, Khris, over the phone- all questions answered
no

## 2022-11-10 ENCOUNTER — APPOINTMENT (OUTPATIENT)
Dept: INTERNAL MEDICINE | Facility: CLINIC | Age: 52
End: 2022-11-10

## 2022-11-10 VITALS
HEIGHT: 67 IN | DIASTOLIC BLOOD PRESSURE: 74 MMHG | RESPIRATION RATE: 16 BRPM | OXYGEN SATURATION: 97 % | HEART RATE: 95 BPM | BODY MASS INDEX: 25.43 KG/M2 | TEMPERATURE: 98.2 F | WEIGHT: 162 LBS | SYSTOLIC BLOOD PRESSURE: 107 MMHG

## 2022-11-10 DIAGNOSIS — R05.9 COUGH, UNSPECIFIED: ICD-10-CM

## 2022-11-10 DIAGNOSIS — N12 TUBULO-INTERSTITIAL NEPHRITIS, NOT SPECIFIED AS ACUTE OR CHRONIC: ICD-10-CM

## 2022-11-10 PROCEDURE — 99214 OFFICE O/P EST MOD 30 MIN: CPT

## 2022-11-10 NOTE — REVIEW OF SYSTEMS
[Negative] : Neurological [Cough] : cough [Frequency] : frequency [Shortness Of Breath] : no shortness of breath [Wheezing] : no wheezing [Dysuria] : no dysuria [FreeTextEntry4] : C/o runny nose. Denies earache, throat pain [FreeTextEntry7] : Has slight lower abdominal pain

## 2022-11-10 NOTE — PLAN
[FreeTextEntry1] : F/u exam after ER visit was done. Labs, ct scan of the abd/pelvis was reviewed\par Recommend to continue antibiotics as it is prescriped in ER, drink granberry juice, referred to see a urologist(h/o frequent UTI).\par For a cough, runny nose, start taking  tessalon. Recommend to do  covid 19 test\par RTC to f/u if symptoms will persist\par Patient is verbalized understanding.

## 2022-11-10 NOTE — HISTORY OF PRESENT ILLNESS
[FreeTextEntry1] : F/u after ER visit [de-identified] : Ms. PAOLA NEUMANN 52 year female with a PMH Hyperlipidemia, vitamin d defieciency present to the office to f/u after ER visit. As per patient for the past week has discomfort on a urination with a flank pain and fever. On 11/06/22 went to [facility], did a blood test, u/s, ct scan of the abdomen and pelvis. Dz with a pyelonephritis d/c home on  Cefdopoxine 200mg bid. As per patient feels better. Symptoms improved.  As per patient 11 years ago has a bladder lift procedure, since then has frequent UTI symptoms.

## 2022-11-14 DIAGNOSIS — R39.9 UNSPECIFIED SYMPTOMS AND SIGNS INVOLVING THE GENITOURINARY SYSTEM: ICD-10-CM

## 2022-11-15 ENCOUNTER — APPOINTMENT (OUTPATIENT)
Dept: UROLOGY | Facility: CLINIC | Age: 52
End: 2022-11-15

## 2022-11-15 VITALS
TEMPERATURE: 98.3 F | WEIGHT: 162 LBS | OXYGEN SATURATION: 99 % | DIASTOLIC BLOOD PRESSURE: 81 MMHG | HEIGHT: 67 IN | HEART RATE: 72 BPM | BODY MASS INDEX: 25.43 KG/M2 | SYSTOLIC BLOOD PRESSURE: 119 MMHG

## 2022-11-15 DIAGNOSIS — R35.0 FREQUENCY OF MICTURITION: ICD-10-CM

## 2022-11-15 DIAGNOSIS — R10.9 UNSPECIFIED ABDOMINAL PAIN: ICD-10-CM

## 2022-11-15 PROCEDURE — 99242 OFF/OP CONSLTJ NEW/EST SF 20: CPT

## 2022-11-15 NOTE — PHYSICAL EXAM
[General Appearance - Well Developed] : well developed [General Appearance - Well Nourished] : well nourished [Normal Appearance] : normal appearance [Well Groomed] : well groomed [General Appearance - In No Acute Distress] : no acute distress [Edema] : no peripheral edema [Respiration, Rhythm And Depth] : normal respiratory rhythm and effort [Exaggerated Use Of Accessory Muscles For Inspiration] : no accessory muscle use [Abdomen Soft] : soft [Abdomen Tenderness] : non-tender [Costovertebral Angle Tenderness] : no ~M costovertebral angle tenderness [Urinary Bladder Findings] : the bladder was normal on palpation [Normal Station and Gait] : the gait and station were normal for the patient's age [] : no rash [No Focal Deficits] : no focal deficits [Oriented To Time, Place, And Person] : oriented to person, place, and time [Affect] : the affect was normal [Mood] : the mood was normal [Not Anxious] : not anxious [No Palpable Adenopathy] : no palpable adenopathy [FreeTextEntry1] : Kidney percussion test negative bilaterally, no pain reported on bi-manual palpation bilaterally, no pain on superficial and deep palpation on the iliac fossa bilaterally and on the ureteral points.

## 2022-11-15 NOTE — ASSESSMENT
[FreeTextEntry1] : 53y/o female with frequent urination.\par Reports the issue for the last weeks. On therapy with ABX (still for 6 days).\par 4 pregnancies, natural partum. Refers flank pain.\par \par Kidney percussion test negative bilaterally, no pain reported on bi-manual palpation bilaterally, no pain on superficial and deep palpation on the iliac fossa bilaterally and on the ureteral points.\par \par PVR: 0\par \par Collecting urine for culture + UA\par Ordering CT scan for [place].\par \par Will F/U in case of positive results \par \par \par

## 2022-11-15 NOTE — HISTORY OF PRESENT ILLNESS
[FreeTextEntry1] : 53y/o female with frequent urination.\par Reports the issue for the last weeks. On therapy with ABX (still for 6 days).\par 4 pregnancies, natural partum. Refers flank pain.\par

## 2022-11-15 NOTE — REVIEW OF SYSTEMS
[Painful [place]] : painful [place] [Loss of interest] : loss of interest in sexual activity [Date of last menstrual period ____] : date of last menstrual period: [unfilled] [Urine Infection (bladder/kidney)] : bladder/kidney infection [Wake up at night to urinate  How many times?  ___] : wakes up to urinate [unfilled] times during the night [Negative] : Heme/Lymph

## 2022-11-18 LAB
APPEARANCE: CLEAR
BACTERIA UR CULT: NORMAL
BILIRUBIN URINE: NEGATIVE
BLOOD URINE: NEGATIVE
COLOR: NORMAL
GLUCOSE QUALITATIVE U: NEGATIVE
KETONES URINE: NEGATIVE
LEUKOCYTE ESTERASE URINE: NEGATIVE
NITRITE URINE: NEGATIVE
PH URINE: 5.5
PROTEIN URINE: NEGATIVE
SPECIFIC GRAVITY URINE: 1.02
URINE CYTOLOGY: NORMAL
UROBILINOGEN URINE: NORMAL

## 2022-11-22 ENCOUNTER — APPOINTMENT (OUTPATIENT)
Dept: CT IMAGING | Facility: IMAGING CENTER | Age: 52
End: 2022-11-22

## 2023-01-05 ENCOUNTER — APPOINTMENT (OUTPATIENT)
Dept: INTERNAL MEDICINE | Facility: CLINIC | Age: 53
End: 2023-01-05
Payer: MEDICAID

## 2023-01-05 VITALS
DIASTOLIC BLOOD PRESSURE: 73 MMHG | HEART RATE: 74 BPM | SYSTOLIC BLOOD PRESSURE: 112 MMHG | OXYGEN SATURATION: 97 % | TEMPERATURE: 97.4 F | BODY MASS INDEX: 26.18 KG/M2 | RESPIRATION RATE: 16 BRPM | HEIGHT: 67 IN | WEIGHT: 166.8 LBS

## 2023-01-05 DIAGNOSIS — Z13.29 ENCOUNTER FOR SCREENING FOR OTHER SUSPECTED ENDOCRINE DISORDER: ICD-10-CM

## 2023-01-05 DIAGNOSIS — R51.9 HEADACHE, UNSPECIFIED: ICD-10-CM

## 2023-01-05 DIAGNOSIS — G56.00 CARPAL TUNNEL SYNDROME, UNSPECIFIED UPPER LIMB: ICD-10-CM

## 2023-01-05 DIAGNOSIS — Z13.1 ENCOUNTER FOR SCREENING FOR DIABETES MELLITUS: ICD-10-CM

## 2023-01-05 PROCEDURE — 99214 OFFICE O/P EST MOD 30 MIN: CPT | Mod: 25

## 2023-01-05 RX ORDER — RIMEGEPANT SULFATE 75 MG/75MG
75 TABLET, ORALLY DISINTEGRATING ORAL
Refills: 0 | Status: ACTIVE | COMMUNITY

## 2023-01-05 NOTE — HISTORY OF PRESENT ILLNESS
[FreeTextEntry1] : F/u after seeing neurologist [de-identified] : Ms. PAOLA NEUMANN 52 year female with a PMH Hyperlipidemia, vitamin d deficiency, h/o migraine, CTS of the R hand present to the office to f/u after seeing a neurologist. As per patient was seen by a neurologist for a migraine headache. Had an MRI, MRA of the brain . MRA of the brain from 12/20/2022 showes less than 50 percent by NASCET criteria stenosis of origin of the Right ICA. Patient started on ASA 81mg. Also start patient on Nurtec(rimegepant 75mg). Patient feels good, denies complaints at present time. Wants to do a blood test

## 2023-01-05 NOTE — PLAN
[FreeTextEntry1] : F/u exam is performed. Recommend  to do a blood test today, further management will depend on the blood test results. \par MRA report of the brain reviewed with the patient, recommend to continue to take ASA 81 mg, low cholesterol diet, referred to see a vascular doctor\par Refused to do a mammogram, as per patient did breast augmentation on 04/2022 in [facility](did mammo prior was wnl)\par  RTC to f/u in 2 wks. Patient is verbalized understanding\par

## 2023-01-06 LAB
25(OH)D3 SERPL-MCNC: 24.8 NG/ML
ALBUMIN SERPL ELPH-MCNC: 4.2 G/DL
ALP BLD-CCNC: 83 U/L
ALT SERPL-CCNC: 11 U/L
ANION GAP SERPL CALC-SCNC: 11 MMOL/L
AST SERPL-CCNC: 12 U/L
BASOPHILS # BLD AUTO: 0.03 K/UL
BASOPHILS NFR BLD AUTO: 0.6 %
BILIRUB SERPL-MCNC: 0.6 MG/DL
BUN SERPL-MCNC: 13 MG/DL
CALCIUM SERPL-MCNC: 9.4 MG/DL
CHLORIDE SERPL-SCNC: 106 MMOL/L
CHOLEST SERPL-MCNC: 157 MG/DL
CO2 SERPL-SCNC: 24 MMOL/L
CREAT SERPL-MCNC: 0.74 MG/DL
EGFR: 97 ML/MIN/1.73M2
EOSINOPHIL # BLD AUTO: 0.13 K/UL
EOSINOPHIL NFR BLD AUTO: 2.6 %
ESTIMATED AVERAGE GLUCOSE: 114 MG/DL
GLUCOSE SERPL-MCNC: 133 MG/DL
HBA1C MFR BLD HPLC: 5.6 %
HCT VFR BLD CALC: 38.8 %
HDLC SERPL-MCNC: 57 MG/DL
HGB BLD-MCNC: 12.1 G/DL
IMM GRANULOCYTES NFR BLD AUTO: 0.2 %
IRON SATN MFR SERPL: 18 %
IRON SERPL-MCNC: 52 UG/DL
LDLC SERPL CALC-MCNC: 81 MG/DL
LYMPHOCYTES # BLD AUTO: 1.5 K/UL
LYMPHOCYTES NFR BLD AUTO: 29.9 %
MAN DIFF?: NORMAL
MCHC RBC-ENTMCNC: 28.1 PG
MCHC RBC-ENTMCNC: 31.2 GM/DL
MCV RBC AUTO: 90 FL
MONOCYTES # BLD AUTO: 0.35 K/UL
MONOCYTES NFR BLD AUTO: 7 %
NEUTROPHILS # BLD AUTO: 2.99 K/UL
NEUTROPHILS NFR BLD AUTO: 59.7 %
NONHDLC SERPL-MCNC: 99 MG/DL
PLATELET # BLD AUTO: 208 K/UL
POTASSIUM SERPL-SCNC: 3.8 MMOL/L
PROT SERPL-MCNC: 6.7 G/DL
RBC # BLD: 4.31 M/UL
RBC # FLD: 14.5 %
SODIUM SERPL-SCNC: 142 MMOL/L
TIBC SERPL-MCNC: 293 UG/DL
TRIGL SERPL-MCNC: 94 MG/DL
TSH SERPL-ACNC: 1.39 UIU/ML
UIBC SERPL-MCNC: 241 UG/DL
WBC # FLD AUTO: 5.01 K/UL

## 2023-01-06 NOTE — ED ADULT NURSE NOTE - NS ED PATIENT SAFETY CONCERN
Assessment/Plan:   1  Eustachian tube dysfunction, right  Patient tolerated irrigation very well  Cerumen was completely removed  Continue with routine monitoring  2  Screening for prostate cancer  - PSA, Total Screen; Future           Diagnoses and all orders for this visit:    Screening for prostate cancer  -     PSA, Total Screen; Future          Subjective:       Chief Complaint   Patient presents with   • Cerumen Impaction      Patient ID: Yadira Stover is a 75 y o  male is today with a CC of right cerumen impaction  He has had this for the past few days  Symptoms started gradually  He states that he has not tried anything to relieve the wax from his ears  He has been having mild hearing loss  HPI    Review of Systems   Constitutional: Negative for activity change, chills, fatigue and fever  HENT: Positive for hearing loss  Negative for congestion, ear discharge, ear pain, sinus pressure and sore throat  Eyes: Negative for redness, itching and visual disturbance  Respiratory: Negative for cough and shortness of breath  Cardiovascular: Negative for chest pain and palpitations  Gastrointestinal: Negative for abdominal pain, diarrhea and nausea  Endocrine: Negative for cold intolerance and heat intolerance  Genitourinary: Negative for dysuria, flank pain and frequency  Musculoskeletal: Negative for arthralgias, back pain, gait problem and myalgias  Skin: Negative for color change  Allergic/Immunologic: Negative for environmental allergies  Neurological: Negative for dizziness, numbness and headaches  Psychiatric/Behavioral: Negative for behavioral problems and sleep disturbance  The following portions of the patient's history were reviewed and updated as appropriate : past family history, past medical history, past social history and past surgical history      Current Outpatient Medications:   •  atorvastatin (LIPITOR) 20 mg tablet, Take 1 tablet (20 mg total) by mouth daily, Disp: 90 tablet, Rfl: 1  •  carbamide peroxide (DEBROX) 6 5 % otic solution, Administer 5 drops to the right ear 2 (two) times a day, Disp: 15 mL, Rfl: 0  •  Cholecalciferol 125 MCG (5000 UT) capsule, Take 5,000 Units by mouth daily  , Disp: , Rfl:   •  clobetasol (TEMOVATE) 0 05 % cream, Apply 1 application topically 2 (two) times a day  , Disp: , Rfl:   •  fluticasone (FLONASE) 50 mcg/act nasal spray, 2 sprays into each nostril daily, Disp: 48 mL, Rfl: 1  •  omeprazole (PriLOSEC) 20 mg delayed release capsule, Take 1 capsule (20 mg total) by mouth daily, Disp: 30 capsule, Rfl: 2  •  tadalafil (CIALIS) 20 MG tablet, Take 1 tablet (20 mg total) by mouth daily as needed for erectile dysfunction, Disp: 15 tablet, Rfl: 3         Objective:         Vitals:    01/06/23 1018   BP: 130/78   Pulse: 82   Temp: (!) 96 6 °F (35 9 °C)   TempSrc: Tympanic   SpO2: 96%   Weight: 95 3 kg (210 lb 3 2 oz)   Height: 5' 11" (1 803 m)     Physical Exam  Vitals reviewed  Constitutional:       Appearance: Normal appearance  He is well-developed  HENT:      Head: Normocephalic and atraumatic  Right Ear: Hearing normal       Left Ear: Hearing normal       Nose: Nose normal  No septal deviation  Eyes:      General: Lids are normal       Pupils: Pupils are equal, round, and reactive to light  Neck:      Thyroid: No thyroid mass or thyromegaly  Trachea: Trachea normal    Cardiovascular:      Rate and Rhythm: Normal rate  Pulmonary:      Effort: Pulmonary effort is normal  No respiratory distress  Breath sounds: No decreased breath sounds  Abdominal:      Tenderness: There is no guarding  Musculoskeletal:         General: Normal range of motion  Cervical back: Normal range of motion  Skin:     General: Skin is warm and dry  Neurological:      Mental Status: He is alert and oriented to person, place, and time  Cranial Nerves: No cranial nerve deficit  Sensory: No sensory deficit  Psychiatric:         Speech: Speech normal          Behavior: Behavior normal          Thought Content: Thought content normal          Judgment: Judgment normal        Ear cerumen removal    Date/Time: 1/7/2023 9:29 PM  Performed by: Kristina Shetty DO  Authorized by: Kristina Shetty DO   Universal Protocol:  Consent: Verbal consent obtained  Written consent not obtained  Risks and benefits: risks, benefits and alternatives were discussed  Consent given by: patient      Patient location:  Clinic  Procedure details:     Local anesthetic:  None    Location:  R ear    Procedure type: irrigation only      Approach:  External  Post-procedure details:     Complication:  None    Hearing quality:  Improved    Patient tolerance of procedure:   Tolerated well, no immediate complications No

## 2023-01-23 ENCOUNTER — APPOINTMENT (OUTPATIENT)
Dept: VASCULAR SURGERY | Facility: CLINIC | Age: 53
End: 2023-01-23
Payer: MEDICAID

## 2023-01-23 VITALS
DIASTOLIC BLOOD PRESSURE: 76 MMHG | OXYGEN SATURATION: 97 % | SYSTOLIC BLOOD PRESSURE: 119 MMHG | WEIGHT: 164 LBS | HEIGHT: 67 IN | TEMPERATURE: 98.3 F | BODY MASS INDEX: 25.74 KG/M2 | HEART RATE: 75 BPM

## 2023-01-23 DIAGNOSIS — I65.21 OCCLUSION AND STENOSIS OF RIGHT CAROTID ARTERY: ICD-10-CM

## 2023-01-23 PROCEDURE — 99202 OFFICE O/P NEW SF 15 MIN: CPT

## 2023-01-23 NOTE — HISTORY OF PRESENT ILLNESS
[FreeTextEntry1] : [employer]  #[id number].\par \par PAOLA NEUMANN is a 52 year old female who presents for evaluation of carotid stenosis. \par \par Referred by Dr. Ketty Adams. \par \par Patient states that she had had headaches for many years and was referred for neurology evaluation. She was prescribed a medication (Ubrelvy), which she tried without improvement. She is currently on a different medication for migraines but she does not recall name. This was approximately 4-5 months ago. With persistent symptoms, she was sent for MRA neck and was found to have carotid stenosis. The patient was started on aspirin 81 mg daily.  \par Patient denies any history of symptoms of unilateral numbness or weakness, denies any history of slurred speech, or amaurosis fugax. \par \par + PMH: Migraines, right carpal tunnel syndrome right hand\par + PSH: s/p breast augmentation, s/p bladder surgery, s/p liposuction, s/p ovarian surgery\par + FH: diabetes, hypothyroidism\par + SH: current daily cigarettes, no etoh use\par + Aller: NKDA\par \par PCP is Dr. Ketty Adams.

## 2023-01-23 NOTE — ASSESSMENT
[FreeTextEntry1] : PAOLA NEUMANN is a 52 year old female presents for evaluation.\par \par > Right carotid stenosis, mild, asymptomatic\par - Will obtain carotid duplex for further evaluation\par - Mild asymptomatic carotid stenosis does not require surgical intervention. Recommend medical management. Counseled patient on importance of smoking cessation. Continue aspirin 81 mg daily. Will discuss w/ PCP regarding starting patient on statin. \par - Will call pt back with results of carotid duplex. \par - Follow up in one year.\par - Counseled pt on symptoms of stroke/TIA and to present to ED if has symptoms.

## 2023-01-23 NOTE — DATA REVIEWED
[FreeTextEntry1] : 1/23/2023 - Reviewed results of MRA neck/MRI head performed on 12/27/2022. This was negative for any infarct. There is < 50% stegnosis of the right internal carotid artery.

## 2023-01-23 NOTE — CONSULT LETTER
[Dear  ___] : Dear  [unfilled], [Consult Letter:] : I had the pleasure of evaluating your patient, [unfilled]. [Please see my note below.] : Please see my note below. [Consult Closing:] : Thank you very much for allowing me to participate in the care of this patient.  If you have any questions, please do not hesitate to contact me. [Sincerely,] : Sincerely, [FreeTextEntry1] : She has a several month history of migraines, for which she has been following with neurology. Last month, she underwent an MRA of the neck as well as an MRI of the brain.  This was significant for less than 50% stenosis of the right internal carotid artery.  On history, she denies any history of unilateral numbness or weakness, slurred speech, amaurosis fugax, or other symptoms suggestive of stroke or TIA.  On physical exam, she is neurologically intact.  I did not appreciate a carotid bruit on the right side.  I reviewed the results of the MRI and plan to review the images.  I ordered a carotid duplex for further evaluation.\par Given that she has mild, asymptomatic right-sided carotid stenosis, no surgical intervention is needed at this time.  I would recommend medical management.  I discussed with the patient the importance of smoking cessation.  I advised her to continue aspirin 81 mg daily.  I would also recommend starting the patient on a statin as part of the medical management of carotid stenosis. [FreeTextEntry3] : \par \par \par \par Mia Luevano MD, FACS, RPVI\par Division of Vascular & Endovascular Surgery\par [facility], Department of Surgery

## 2023-01-23 NOTE — PHYSICAL EXAM
[2+] : left 2+ [Calm] : calm [de-identified] : Well-appearing  [de-identified] : EOMI, anicteric, CN II-XII grossly intact.  [de-identified] : soft, nt, nd  [de-identified] : motor and sensation intact in all four extremities \par strength 5/5 all four extremities [de-identified] : no wounds on bilateral feet

## 2023-03-08 ENCOUNTER — NON-APPOINTMENT (OUTPATIENT)
Age: 53
End: 2023-03-08

## 2023-03-14 ENCOUNTER — RESULT REVIEW (OUTPATIENT)
Age: 53
End: 2023-03-14

## 2023-03-14 ENCOUNTER — OUTPATIENT (OUTPATIENT)
Dept: OUTPATIENT SERVICES | Facility: HOSPITAL | Age: 53
LOS: 1 days | Discharge: HOME | End: 2023-03-14
Payer: MEDICAID

## 2023-03-14 ENCOUNTER — APPOINTMENT (OUTPATIENT)
Dept: ULTRASOUND IMAGING | Facility: HOSPITAL | Age: 53
End: 2023-03-14

## 2023-03-14 DIAGNOSIS — I65.23 OCCLUSION AND STENOSIS OF BILATERAL CAROTID ARTERIES: ICD-10-CM

## 2023-03-14 DIAGNOSIS — I65.21 OCCLUSION AND STENOSIS OF RIGHT CAROTID ARTERY: ICD-10-CM

## 2023-03-14 DIAGNOSIS — R51.9 HEADACHE, UNSPECIFIED: ICD-10-CM

## 2023-03-14 DIAGNOSIS — Z90.721 ACQUIRED ABSENCE OF OVARIES, UNILATERAL: Chronic | ICD-10-CM

## 2023-03-14 PROCEDURE — 93880 EXTRACRANIAL BILAT STUDY: CPT | Mod: 26

## 2023-03-17 ENCOUNTER — NON-APPOINTMENT (OUTPATIENT)
Age: 53
End: 2023-03-17

## 2023-03-28 ENCOUNTER — TRANSCRIPTION ENCOUNTER (OUTPATIENT)
Age: 53
End: 2023-03-28

## 2023-04-11 ENCOUNTER — APPOINTMENT (OUTPATIENT)
Dept: INTERNAL MEDICINE | Facility: CLINIC | Age: 53
End: 2023-04-11
Payer: MEDICAID

## 2023-04-11 ENCOUNTER — NON-APPOINTMENT (OUTPATIENT)
Age: 53
End: 2023-04-11

## 2023-04-11 VITALS
BODY MASS INDEX: 26.06 KG/M2 | DIASTOLIC BLOOD PRESSURE: 60 MMHG | HEART RATE: 86 BPM | OXYGEN SATURATION: 98 % | SYSTOLIC BLOOD PRESSURE: 90 MMHG | HEIGHT: 67 IN | TEMPERATURE: 98.3 F | WEIGHT: 166 LBS

## 2023-04-11 DIAGNOSIS — R73.9 HYPERGLYCEMIA, UNSPECIFIED: ICD-10-CM

## 2023-04-11 DIAGNOSIS — Z87.891 PERSONAL HISTORY OF NICOTINE DEPENDENCE: ICD-10-CM

## 2023-04-11 DIAGNOSIS — E55.9 VITAMIN D DEFICIENCY, UNSPECIFIED: ICD-10-CM

## 2023-04-11 DIAGNOSIS — I65.29 OCCLUSION AND STENOSIS OF UNSPECIFIED CAROTID ARTERY: ICD-10-CM

## 2023-04-11 DIAGNOSIS — E78.5 HYPERLIPIDEMIA, UNSPECIFIED: ICD-10-CM

## 2023-04-11 DIAGNOSIS — Z12.39 ENCOUNTER FOR OTHER SCREENING FOR MALIGNANT NEOPLASM OF BREAST: ICD-10-CM

## 2023-04-11 DIAGNOSIS — Z13.820 ENCOUNTER FOR SCREENING FOR OSTEOPOROSIS: ICD-10-CM

## 2023-04-11 PROCEDURE — 99396 PREV VISIT EST AGE 40-64: CPT | Mod: 25

## 2023-04-11 PROCEDURE — 93000 ELECTROCARDIOGRAM COMPLETE: CPT | Mod: 59

## 2023-04-11 PROCEDURE — G0444 DEPRESSION SCREEN ANNUAL: CPT | Mod: 59

## 2023-04-11 PROCEDURE — 36415 COLL VENOUS BLD VENIPUNCTURE: CPT

## 2023-04-11 NOTE — PLAN
[FreeTextEntry1] : Ms. PAOLA NEUMANN 52 year female with a PMH Hyperlipidemia, vitamin d deficiency, h/o migraine, CTS of the R hand present to the office for a physical exam. \par Well adult exam is performed. Recommend  to do a blood test today, further management will depend on the blood test results. \par EKG was done and reviewed, NSR 70bpm, T inversion in lead V1, III\par Continue present meds for a migraine\par For insomnia start taking melatonin\par Do mammogram\par Bring report of the colonoscopy\par Do dexa test\par F/u with a GYN\par Referred for a ct scan of the chest(former smoker, quit 6 mo ago), prior smoked since age 12\par  RTC to f/u in 2 wks. Patient is verbalized understanding\par

## 2023-04-11 NOTE — HEALTH RISK ASSESSMENT
[Good] : ~his/her~  mood as  good [Yes] : Yes [Monthly or less (1 pt)] : Monthly or less (1 point) [1 or 2 (0 pts)] : 1 or 2 (0 points) [Never (0 pts)] : Never (0 points) [No] : In the past 12 months have you used drugs other than those required for medical reasons? No [No falls in past year] : Patient reported no falls in the past year [0] : 2) Feeling down, depressed, or hopeless: Not at all (0) [PHQ-2 Negative - No further assessment needed] : PHQ-2 Negative - No further assessment needed [Patient reported mammogram was normal] : Patient reported mammogram was normal [Patient reported colonoscopy was normal] : Patient reported colonoscopy was normal [HIV Test offered] : HIV Test offered [Hepatitis C test offered] : Hepatitis C test offered [None] : None [With Family] : lives with family [# of Members in Household ___] :  household currently consist of [unfilled] member(s) [Employed] : employed [High School] : high school [] :  [# Of Children ___] : has [unfilled] children [Sexually Active] : sexually active [Feels Safe at Home] : Feels safe at home [Fully functional (bathing, dressing, toileting, transferring, walking, feeding)] : Fully functional (bathing, dressing, toileting, transferring, walking, feeding) [Fully functional (using the telephone, shopping, preparing meals, housekeeping, doing laundry, using] : Fully functional and needs no help or supervision to perform IADLs (using the telephone, shopping, preparing meals, housekeeping, doing laundry, using transportation, managing medications and managing finances) [Smoke Detector] : smoke detector [Carbon Monoxide Detector] : carbon monoxide detector [Safety elements used in home] : safety elements used in home [Seat Belt] :  uses seat belt [Sunscreen] : uses sunscreen [With Patient/Caregiver] : , with patient/caregiver [Designated Healthcare Proxy] : Designated healthcare proxy [Name: ___] : Health Care Proxy's Name: [unfilled]  [Relationship: ___] : Relationship: [unfilled] [Aggressive treatment] : aggressive treatment [I will adhere to the patient's wishes.] : I will adhere to the patient's wishes. 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 [Audit-CScore] : 1 [de-identified] : walking [de-identified] : regular diet [DPT1Nthdn] : 0 [Change in mental status noted] : No change in mental status noted [Language] : denies difficulty with language [Behavior] : denies difficulty with behavior [Learning/Retaining New Information] : denies difficulty learning/retaining new information [Handling Complex Tasks] : denies difficulty handling complex tasks [High Risk Behavior] : no high risk behavior [Reports changes in hearing] : Reports no changes in hearing [Reports changes in vision] : Reports no changes in vision [Reports changes in dental health] : Reports no changes in dental health [Guns at Home] : no guns at home [TB Exposure] : is not being exposed to tuberculosis [MammogramDate] : 04/22 [PapSmearDate] : 04/21/23 [MammogramComments] : did in [place] [PapSmearComments] : scheduled [BoneDensityDate] : never did [ColonoscopyDate] : 2 years ago [FreeTextEntry2] : works as a  [AdvancecareDate] : 04/11/2023 [de-identified] : quit 6 mo ago, start smoking since age 12

## 2023-04-11 NOTE — PAST MEDICAL HISTORY
[Surgical Menopause] : in surgical menopause [Menarche Age ____] : age at menarche was [unfilled] [Menopause Age____] : age at menopause was [unfilled] [Total Preg ___] : G[unfilled] [Live Births ___] : P[unfilled]  [AB Induced ___] : elective abortions: [unfilled]  [AB Spont ___] : miscarriages: [unfilled]

## 2023-04-11 NOTE — HISTORY OF PRESENT ILLNESS
[de-identified] : Ms. PAOLA NEUMANN 52 year female with a PMH Hyperlipidemia, vitamin d deficiency, h/o migraine, CTS of the R hand present to the office for a physical exam. Patient feels OK, still has a headache. Seeing neurologist for a migraine headache, getting monthly injection of [id number] and takes nurtec 75mg every other day. As per patient still did not see an improvement of the symptoms. Did not schedule f/u appoitment yet.. Patient also mention that 2 mo ago got an injection for both CTS(feels good, denies complaints). Was seen by a vascular doctor for stenosis of the carotid, did a carotid Doppler on 03/14/23 - No significant hemodynamic stenosis of either carotid artery

## 2023-04-16 LAB
25(OH)D3 SERPL-MCNC: 32 NG/ML
ALBUMIN SERPL ELPH-MCNC: 4.3 G/DL
ALP BLD-CCNC: 81 U/L
ALT SERPL-CCNC: 14 U/L
ANION GAP SERPL CALC-SCNC: 11 MMOL/L
AST SERPL-CCNC: 15 U/L
BILIRUB DIRECT SERPL-MCNC: 0.1 MG/DL
BILIRUB INDIRECT SERPL-MCNC: 0.4 MG/DL
BILIRUB SERPL-MCNC: 0.5 MG/DL
BUN SERPL-MCNC: 16 MG/DL
CALCIUM SERPL-MCNC: 9.6 MG/DL
CHLORIDE SERPL-SCNC: 108 MMOL/L
CHOLEST SERPL-MCNC: 173 MG/DL
CO2 SERPL-SCNC: 25 MMOL/L
CREAT SERPL-MCNC: 0.78 MG/DL
EGFR: 91 ML/MIN/1.73M2
ESTIMATED AVERAGE GLUCOSE: 114 MG/DL
GLUCOSE SERPL-MCNC: 95 MG/DL
HBA1C MFR BLD HPLC: 5.6 %
HDLC SERPL-MCNC: 66 MG/DL
LDLC SERPL CALC-MCNC: 78 MG/DL
NONHDLC SERPL-MCNC: 107 MG/DL
POTASSIUM SERPL-SCNC: 4.4 MMOL/L
PROT SERPL-MCNC: 6.8 G/DL
SODIUM SERPL-SCNC: 144 MMOL/L
TRIGL SERPL-MCNC: 144 MG/DL

## 2023-09-19 ENCOUNTER — APPOINTMENT (OUTPATIENT)
Dept: INTERNAL MEDICINE | Facility: CLINIC | Age: 53
End: 2023-09-19
Payer: MEDICAID

## 2023-09-19 VITALS
HEART RATE: 69 BPM | WEIGHT: 166 LBS | BODY MASS INDEX: 26.06 KG/M2 | OXYGEN SATURATION: 97 % | SYSTOLIC BLOOD PRESSURE: 108 MMHG | HEIGHT: 67 IN | DIASTOLIC BLOOD PRESSURE: 74 MMHG

## 2023-09-19 DIAGNOSIS — R11.10 VOMITING, UNSPECIFIED: ICD-10-CM

## 2023-09-19 DIAGNOSIS — Z00.00 ENCOUNTER FOR GENERAL ADULT MEDICAL EXAMINATION W/OUT ABNORMAL FINDINGS: ICD-10-CM

## 2023-09-19 DIAGNOSIS — S39.012A STRAIN OF MUSCLE, FASCIA AND TENDON OF LOWER BACK, INITIAL ENCOUNTER: ICD-10-CM

## 2023-09-19 PROCEDURE — 99214 OFFICE O/P EST MOD 30 MIN: CPT

## 2023-09-20 LAB
ALBUMIN SERPL ELPH-MCNC: 4.3 G/DL
ALP BLD-CCNC: 84 U/L
ALT SERPL-CCNC: 12 U/L
ANION GAP SERPL CALC-SCNC: 11 MMOL/L
AST SERPL-CCNC: 12 U/L
BILIRUB SERPL-MCNC: 0.5 MG/DL
BUN SERPL-MCNC: 17 MG/DL
CALCIUM SERPL-MCNC: 9.3 MG/DL
CHLORIDE SERPL-SCNC: 107 MMOL/L
CO2 SERPL-SCNC: 25 MMOL/L
CREAT SERPL-MCNC: 0.72 MG/DL
EGFR: 100 ML/MIN/1.73M2
GLUCOSE SERPL-MCNC: 102 MG/DL
HCT VFR BLD CALC: 38.1 %
HGB BLD-MCNC: 12 G/DL
MCHC RBC-ENTMCNC: 28.3 PG
MCHC RBC-ENTMCNC: 31.5 GM/DL
MCV RBC AUTO: 89.9 FL
PLATELET # BLD AUTO: 228 K/UL
POTASSIUM SERPL-SCNC: 4.3 MMOL/L
PROT SERPL-MCNC: 6.8 G/DL
RBC # BLD: 4.24 M/UL
RBC # FLD: 14.7 %
SODIUM SERPL-SCNC: 142 MMOL/L
WBC # FLD AUTO: 6.45 K/UL

## 2023-10-12 ENCOUNTER — APPOINTMENT (OUTPATIENT)
Dept: ORTHOPEDIC SURGERY | Facility: CLINIC | Age: 53
End: 2023-10-12

## 2023-11-06 ENCOUNTER — APPOINTMENT (OUTPATIENT)
Dept: INTERNAL MEDICINE | Facility: CLINIC | Age: 53
End: 2023-11-06
Payer: MEDICAID

## 2023-11-06 VITALS
HEART RATE: 80 BPM | SYSTOLIC BLOOD PRESSURE: 100 MMHG | OXYGEN SATURATION: 98 % | BODY MASS INDEX: 25.9 KG/M2 | DIASTOLIC BLOOD PRESSURE: 70 MMHG | HEIGHT: 67 IN | WEIGHT: 165 LBS

## 2023-11-06 DIAGNOSIS — R10.9 UNSPECIFIED ABDOMINAL PAIN: ICD-10-CM

## 2023-11-06 PROCEDURE — 99214 OFFICE O/P EST MOD 30 MIN: CPT

## 2023-11-08 LAB
ALBUMIN SERPL ELPH-MCNC: 4.3 G/DL
ALP BLD-CCNC: 77 U/L
ALT SERPL-CCNC: 13 U/L
ANION GAP SERPL CALC-SCNC: 12 MMOL/L
AST SERPL-CCNC: 13 U/L
BASOPHILS # BLD AUTO: 0.06 K/UL
BASOPHILS NFR BLD AUTO: 0.7 %
BILIRUB SERPL-MCNC: 0.4 MG/DL
BUN SERPL-MCNC: 21 MG/DL
C TRACH RRNA SPEC QL NAA+PROBE: NOT DETECTED
CALCIUM SERPL-MCNC: 9.8 MG/DL
CHLORIDE SERPL-SCNC: 106 MMOL/L
CO2 SERPL-SCNC: 24 MMOL/L
CREAT SERPL-MCNC: 0.88 MG/DL
EGFR: 78 ML/MIN/1.73M2
EOSINOPHIL # BLD AUTO: 0.06 K/UL
EOSINOPHIL NFR BLD AUTO: 0.7 %
GLUCOSE SERPL-MCNC: 88 MG/DL
HCT VFR BLD CALC: 38.2 %
HCV AB SER QL: NONREACTIVE
HCV S/CO RATIO: 0.15 S/CO
HGB BLD-MCNC: 12.4 G/DL
HIV1+2 AB SPEC QL IA.RAPID: NONREACTIVE
IMM GRANULOCYTES NFR BLD AUTO: 0.4 %
LPL SERPL-CCNC: 36 U/L
LYMPHOCYTES # BLD AUTO: 2.56 K/UL
LYMPHOCYTES NFR BLD AUTO: 29.9 %
MAN DIFF?: NORMAL
MCHC RBC-ENTMCNC: 29.2 PG
MCHC RBC-ENTMCNC: 32.5 GM/DL
MCV RBC AUTO: 89.9 FL
MONOCYTES # BLD AUTO: 0.43 K/UL
MONOCYTES NFR BLD AUTO: 5 %
N GONORRHOEA RRNA SPEC QL NAA+PROBE: NOT DETECTED
NEUTROPHILS # BLD AUTO: 5.41 K/UL
NEUTROPHILS NFR BLD AUTO: 63.3 %
PLATELET # BLD AUTO: 261 K/UL
POTASSIUM SERPL-SCNC: 4.7 MMOL/L
PROT SERPL-MCNC: 6.8 G/DL
RBC # BLD: 4.25 M/UL
RBC # FLD: 15 %
SODIUM SERPL-SCNC: 142 MMOL/L
SOURCE AMPLIFICATION: NORMAL
T PALLIDUM AB SER QL IA: NEGATIVE
TSH SERPL-ACNC: 1.06 UIU/ML
WBC # FLD AUTO: 8.55 K/UL

## 2023-12-04 ENCOUNTER — APPOINTMENT (OUTPATIENT)
Dept: INTERNAL MEDICINE | Facility: CLINIC | Age: 53
End: 2023-12-04

## 2024-01-22 ENCOUNTER — APPOINTMENT (OUTPATIENT)
Dept: VASCULAR SURGERY | Facility: CLINIC | Age: 54
End: 2024-01-22
Payer: MEDICAID

## 2024-01-22 VITALS
HEIGHT: 67 IN | DIASTOLIC BLOOD PRESSURE: 61 MMHG | HEART RATE: 72 BPM | WEIGHT: 166 LBS | BODY MASS INDEX: 26.06 KG/M2 | OXYGEN SATURATION: 97 % | SYSTOLIC BLOOD PRESSURE: 93 MMHG

## 2024-01-22 PROCEDURE — 93880 EXTRACRANIAL BILAT STUDY: CPT

## 2024-01-22 PROCEDURE — 99213 OFFICE O/P EST LOW 20 MIN: CPT

## 2024-01-22 RX ORDER — ERGOCALCIFEROL 1.25 MG/1
1.25 MG CAPSULE, LIQUID FILLED ORAL
Qty: 5 | Refills: 2 | Status: DISCONTINUED | COMMUNITY
Start: 2022-10-04 | End: 2024-01-22

## 2024-01-22 RX ORDER — NAPROXEN 500 MG/1
500 TABLET ORAL TWICE DAILY
Qty: 28 | Refills: 0 | Status: DISCONTINUED | COMMUNITY
Start: 2023-09-19 | End: 2024-01-22

## 2024-01-22 RX ORDER — BENZONATATE 200 MG/1
200 CAPSULE ORAL 3 TIMES DAILY
Qty: 21 | Refills: 0 | Status: DISCONTINUED | COMMUNITY
Start: 2022-11-10 | End: 2024-01-22

## 2024-01-22 NOTE — ASSESSMENT
[FreeTextEntry1] : PAOLA NEUMANN is a 53 year old female presents for follow up.   - carotid dup reviewed w/ patient. no hemodynamically significant stenosis bilateral carotid duplex - follow up as needed

## 2024-01-22 NOTE — DATA REVIEWED
[FreeTextEntry1] : 1/22/2024 - carotid duplex No significant stenosis of either carotid artery.  --------------------------- 3/17/2023 - Carotid duplex No significant hemodynamic stenosis of either carotid artery.  ------------------------------ 1/23/2023 - Reviewed results of MRA neck/MRI head performed on 12/27/2022. This was negative for any infarct. There is < 50% stenosis of the right internal carotid artery.

## 2024-01-22 NOTE — PHYSICAL EXAM
[Calm] : calm [de-identified] : Well-appearing  [de-identified] : EOMI, anicteric, CN II-XII grossly intact.  [de-identified] : motor and sensation intact in all four extremities \par  strength 5/5 all four extremities

## 2024-01-22 NOTE — HISTORY OF PRESENT ILLNESS
[FreeTextEntry1] : [employer]  #[id number].  PAOLA NEUMANN is a 52 year old female who presents for evaluation of carotid stenosis.   Referred by Dr. Ketty Adams.   Patient states that she had had headaches for many years and was referred for neurology evaluation. She was prescribed a medication (Ubrelvy), which she tried without improvement. She is currently on a different medication for migraines but she does not recall name. This was approximately 4-5 months ago. With persistent symptoms, she was sent for MRA neck and was found to have carotid stenosis. The patient was started on aspirin 81 mg daily.   Patient denies any history of symptoms of unilateral numbness or weakness, denies any history of slurred speech, or amaurosis fugax.   + PMH: Migraines, right carpal tunnel syndrome right hand + PSH: s/p breast augmentation, s/p bladder surgery, s/p liposuction, s/p ovarian surgery + FH: diabetes, hypothyroidism + SH: current daily cigarettes, no etoh use + Aller: NKDA  PCP is Dr. Ketty Adams. [de-identified] : 1/22/2024 -  [id number] ([place]). Continues to have headaches. Does not have any unilateral numbness or weakness other than her symptoms of right hand carpal tunnel syndrome. Right hand with numbness and pain. Continues to see neurologist for her headaches. No slurred speech or amaurosis fugax. Only takes medication for her migraine but she does not recall name of medication.

## 2024-03-31 NOTE — ED ADULT NURSE NOTE - GASTROINTESTINAL WDL
EMERGENCY DEPARTMENT HISTORY AND PHYSICAL EXAM      Date: 3/31/2024  Patient Name: Peyton Tyler      History of Presenting Illness     Chief Complaint   Patient presents with    Cough    Shortness of Breath       Location/Duration/Severity/Modifying factors   Chief Complaint   Patient presents with    Cough    Shortness of Breath       HPI:  Peyton Tyler is a 27 y.o. female with PMH significant for asthma presents with 2 days of productive cough, shortness of breath, nasal congestion. Pt  denies shortness of breath at rest, left-sided chest pain, nausea or vomiting, documented fevers.  No improvement of symptoms with albuterol inhaler treatments attempted at home include over-the-counter cough medication with minimal relief.    PCP: Melony Webster, DINA    Current Facility-Administered Medications   Medication Dose Route Frequency Provider Last Rate Last Admin    dexAMETHasone (DECADRON) tablet 10 mg  10 mg Oral Once Ez León DO         Current Outpatient Medications   Medication Sig Dispense Refill    amoxicillin (AMOXIL) 500 MG capsule Take 2 capsules by mouth 3 times daily for 5 days 30 capsule 0    doxycycline hyclate (VIBRA-TABS) 100 MG tablet Take 1 tablet by mouth 2 times daily for 5 days 10 tablet 0    Dextromethorphan-guaiFENesin (MUCINEX DM MAXIMUM STRENGTH)  MG TB12 Take 1 tablet by mouth 2 times daily as needed (cough) 28 tablet 0    ibuprofen (ADVIL;MOTRIN) 600 MG tablet Take 1 tablet by mouth 3 times daily as needed for Pain 30 tablet 0    albuterol sulfate HFA (VENTOLIN HFA) 108 (90 Base) MCG/ACT inhaler Inhale 2 puffs into the lungs 4 times daily as needed for Wheezing 54 g 1    amitriptyline (ELAVIL) 10 MG tablet ceived the following from [facility]: Outside name: amitriptyline (ELAVIL) 10 mg tablet      ipratropium-albuterol (DUONEB) 0.5-2.5 (3) MG/3ML SOLN nebulizer solution Inhale 3 mLs into the lungs every 4 hours as needed      montelukast 
Abdomen soft, nontender, nondistended, bowel sounds present in all 4 quadrants.

## 2024-07-15 NOTE — ED PROVIDER NOTE - NS HIV RISK FACTOR YES
Declined [facility]  [id number] [place] [id number]  Phone: [phone]  Fax: [phone]  Follow Up Time: 2 weeks

## 2024-09-11 ENCOUNTER — APPOINTMENT (OUTPATIENT)
Dept: ORTHOPEDIC SURGERY | Facility: CLINIC | Age: 54
End: 2024-09-11
Payer: MEDICAID

## 2024-09-11 VITALS — BODY MASS INDEX: 25.9 KG/M2 | WEIGHT: 165 LBS | HEIGHT: 67 IN

## 2024-09-11 DIAGNOSIS — S32.010A WEDGE COMPRESSION FRACTURE OF FIRST LUMBAR VERTEBRA, INITIAL ENCOUNTER FOR CLOSED FRACTURE: ICD-10-CM

## 2024-09-11 PROCEDURE — 99203 OFFICE O/P NEW LOW 30 MIN: CPT

## 2024-09-11 NOTE — HISTORY OF PRESENT ILLNESS
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

## 2024-09-11 NOTE — PHYSICAL EXAM
[de-identified] : Constitutional: Well-nourished, well-developed, No acute distress Respiratory:  Good respiratory effort, no SOB Lymphatic: No regional lymphadenopathy, no lymphedema Psychiatric: Pleasant and normal affect, alert and oriented x3 Musculoskeletal: normal except where as noted in regional exam  Lumbar Spine Exam  APPEARANCE: no marked deformities or malalignment, + loss of lordotic curvature of the lumbosacral spine. + poor posture POSITIVE TENDERNESS: + TTP of the thoracolumbar junction and midline tenderness particularly at L1, + b/l lower lumbar tenderness and spasm noted in erector spinae and quadratus lumborum NONTENDER: none ROM: Significantly limited in all directions due to stiffness and pain RESISTIVE TESTING: painful 3/5 resisted flex/ext, sidebending b/l, and rotation NEURO:  L1 - S2 intact to sensation and motor

## 2024-09-11 NOTE — DISCUSSION/SUMMARY
[de-identified] : Discussed findings of today's exam and possible causes of patient's pain.  Educated patient on their diagnosis of L1 compression fracture status post fall from a height off a ladder.  Reviewed possible courses of treatment, and we collaboratively decided best course of treatment at this time will include conservative management.  Patient had a mechanical fall off of a ladder from approximately 8 feet.  She was seen at [facility] in [place] and had extensive imaging performed, the most notable finding was an L1 compression fracture.  Patient currently has a properly fitting TLSO brace which she should continue wearing for support.  She is utilizing a walker for ambulatory assistive device.  She may continue to do so.  Patient has ibuprofen 800 mg tablets from the ER, she states these are providing sufficient pain relief.  Patient is advised to recommend consultation with one of my orthopedic spine Associates for further care and management of this acute vertebral fracture.  She is advised she should obtain a CD M of her spine images from the hospital, they cannot accurately treat her condition based on imaging reports, it is much more important to have the actual CD of images so they can properly assess the compression fracture and decide if she is a candidate for any surgical intervention or if she would benefit from nonsurgical management.  Patient states she will obtain her CD of images from the hospital and I will help facilitate consultation with one of my orthopedic spine Associates within the next 1 week.  Patient appreciates and agrees with current plan.  This note was generated using dragon medical dictation software.  A reasonable effort has been made for proofreading its contents, but typos may still remain.  If there are any questions or points of clarification needed please notify my office.

## 2024-09-18 ENCOUNTER — APPOINTMENT (OUTPATIENT)
Dept: ORTHOPEDIC SURGERY | Facility: CLINIC | Age: 54
End: 2024-09-18
Payer: MEDICAID

## 2024-09-18 VITALS — HEIGHT: 67 IN | BODY MASS INDEX: 25.9 KG/M2 | WEIGHT: 165 LBS

## 2024-09-18 VITALS — SYSTOLIC BLOOD PRESSURE: 111 MMHG | DIASTOLIC BLOOD PRESSURE: 73 MMHG | HEART RATE: 67 BPM

## 2024-09-18 DIAGNOSIS — S32.009A UNSPECIFIED FRACTURE OF UNSPECIFIED LUMBAR VERTEBRA, INITIAL ENCOUNTER FOR CLOSED FRACTURE: ICD-10-CM

## 2024-09-18 PROCEDURE — 72100 X-RAY EXAM L-S SPINE 2/3 VWS: CPT

## 2024-09-18 PROCEDURE — 99204 OFFICE O/P NEW MOD 45 MIN: CPT

## 2024-09-18 RX ORDER — TRAMADOL HYDROCHLORIDE 50 MG/1
50 TABLET, COATED ORAL
Qty: 20 | Refills: 0 | Status: ACTIVE | COMMUNITY
Start: 2024-09-18 | End: 1900-01-01

## 2024-09-18 NOTE — DISCUSSION/SUMMARY
[de-identified] : L1 compression fracture- 3 weeks. Discussed all options. Brace PRN. Discussed kyphoplasty. All options discussed including rest, medicine, home exercise, acupuncture, Chiropractic care, Physical Therapy, Pain management, and last resort surgery. All questions were answered, all alternatives discussed, and the patient is in complete agreement with the treatment plan which the patient contributed to and discussed with me through the shared decision-making process. Follow-up appointment as instructed. Any issues and the patient will call or come in sooner. Daughter agrees with plan.

## 2024-09-18 NOTE — ADDENDUM
[FreeTextEntry1] : This note was written by Ryley Alonzo on 09/18/2024 acting as scribe for Dr. Yang Wade M.D.  I, Yang Wade MD, have read and attest that all the information, medical decision making and discharge instructions within are true and accurate.

## 2024-09-18 NOTE — PHYSICAL EXAM
[Normal] : Gait: normal [Adams's Sign] : negative Adams's sign [Pronator Drift] : negative pronator drift [SLR] : negative straight leg raise [de-identified] : 5 out of 5 motor strength, sensation is intact and symmetrical full range of motion flexion extension and rotation, no palpatory tenderness full range of motion of hips knees shoulders and elbows (all four extremities), no atrophy, negative straight leg raise, no pathological reflexes, no swelling, normal ambulation, no apparent distress skin is intact, no palpable lymph nodes, no upper or lower extremity instability, alert and oriented x3 and normal mood. Normal finger-to nose test.  No upper motor neuron findings. [de-identified] : XR AP Lat Lumbar 09/18/2024 -L1 compression fracture- reviewed with patient.

## 2024-09-18 NOTE — HISTORY OF PRESENT ILLNESS
[Stable] : stable [de-identified] : 53 year old female presents for initial evaluation of lower back pain since 8/28/24. 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 She was on a 10 foot ladder and fell off and landed on her back.  She states that the pain is localized to the lower back. Has numbness/tingling of the RLE.  Pain is worse with laying down.  She went to [facility] ED and had CT A/P done which revealed a L1 compression fracture. She was prescribed oxycodone and muscle relaxants which help. She also takes NSAIDs for the pain.  She states that the pain affects her sleep.  Currently in a ADOP brace.  Had recently seen Dr. Arboleda for this as well. PMHx: migraine headaches Works as a .  No fever, chills, sweats, nausea/vomiting. No bowel or bladder dysfunction, no recent weight loss or gain. No night pain. This history is in addition to the intake form that I personally reviewed.

## 2024-09-26 ENCOUNTER — EMERGENCY (EMERGENCY)
Facility: HOSPITAL | Age: 54
LOS: 0 days | Discharge: HOME | End: 2024-09-27
Attending: STUDENT IN AN ORGANIZED HEALTH CARE EDUCATION/TRAINING PROGRAM
Payer: MEDICAID

## 2024-09-26 VITALS
SYSTOLIC BLOOD PRESSURE: 119 MMHG | HEART RATE: 86 BPM | OXYGEN SATURATION: 96 % | RESPIRATION RATE: 20 BRPM | DIASTOLIC BLOOD PRESSURE: 79 MMHG | WEIGHT: 164.91 LBS | TEMPERATURE: 98 F | HEIGHT: 67 IN

## 2024-09-26 DIAGNOSIS — M54.50 LOW BACK PAIN, UNSPECIFIED: ICD-10-CM

## 2024-09-26 DIAGNOSIS — Z90.721 ACQUIRED ABSENCE OF OVARIES, UNILATERAL: Chronic | ICD-10-CM
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
## 2024-09-26 DIAGNOSIS — R06.09 OTHER FORMS OF DYSPNEA: ICD-10-CM
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
## 2024-09-26 DIAGNOSIS — M54.6 PAIN IN THORACIC SPINE: ICD-10-CM

## 2024-09-26 DIAGNOSIS — R42 DIZZINESS AND GIDDINESS: ICD-10-CM

## 2024-09-26 LAB
ALBUMIN SERPL ELPH-MCNC: 3.8 G/DL — SIGNIFICANT CHANGE UP (ref 3.3–5)
ALP SERPL-CCNC: 139 U/L — HIGH (ref 40–120)
ALT FLD-CCNC: 26 U/L — SIGNIFICANT CHANGE UP (ref 12–78)
ANION GAP SERPL CALC-SCNC: 7 MMOL/L — SIGNIFICANT CHANGE UP (ref 5–17)
AST SERPL-CCNC: 9 U/L — LOW (ref 15–37)
BASOPHILS # BLD AUTO: 0.05 K/UL — SIGNIFICANT CHANGE UP (ref 0–0.2)
BASOPHILS NFR BLD AUTO: 0.6 % — SIGNIFICANT CHANGE UP (ref 0–2)
BILIRUB SERPL-MCNC: 0.7 MG/DL — SIGNIFICANT CHANGE UP (ref 0.2–1.2)
BUN SERPL-MCNC: 14 MG/DL — SIGNIFICANT CHANGE UP (ref 7–23)
CALCIUM SERPL-MCNC: 9.3 MG/DL — SIGNIFICANT CHANGE UP (ref 8.5–10.1)
CHLORIDE SERPL-SCNC: 108 MMOL/L — SIGNIFICANT CHANGE UP (ref 96–108)
CO2 SERPL-SCNC: 25 MMOL/L — SIGNIFICANT CHANGE UP (ref 22–31)
CREAT SERPL-MCNC: 0.84 MG/DL — SIGNIFICANT CHANGE UP (ref 0.5–1.3)
D DIMER BLD IA.RAPID-MCNC: <150 NG/ML DDU — SIGNIFICANT CHANGE UP
EGFR: 83 ML/MIN/1.73M2 — SIGNIFICANT CHANGE UP
EOSINOPHIL # BLD AUTO: 0.06 K/UL — SIGNIFICANT CHANGE UP (ref 0–0.5)
EOSINOPHIL NFR BLD AUTO: 0.7 % — SIGNIFICANT CHANGE UP (ref 0–6)
GLUCOSE SERPL-MCNC: 92 MG/DL — SIGNIFICANT CHANGE UP (ref 70–99)
HCG SERPL-ACNC: <1 MIU/ML — SIGNIFICANT CHANGE UP
HCT VFR BLD CALC: 38 % — SIGNIFICANT CHANGE UP (ref 34.5–45)
HGB BLD-MCNC: 12.9 G/DL — SIGNIFICANT CHANGE UP (ref 11.5–15.5)
IMM GRANULOCYTES NFR BLD AUTO: 0.2 % — SIGNIFICANT CHANGE UP (ref 0–0.9)
LYMPHOCYTES # BLD AUTO: 2.46 K/UL — SIGNIFICANT CHANGE UP (ref 1–3.3)
LYMPHOCYTES # BLD AUTO: 30.3 % — SIGNIFICANT CHANGE UP (ref 13–44)
MCHC RBC-ENTMCNC: 28.5 PG — SIGNIFICANT CHANGE UP (ref 27–34)
MCHC RBC-ENTMCNC: 33.9 G/DL — SIGNIFICANT CHANGE UP (ref 32–36)
MCV RBC AUTO: 83.9 FL — SIGNIFICANT CHANGE UP (ref 80–100)
MONOCYTES # BLD AUTO: 0.52 K/UL — SIGNIFICANT CHANGE UP (ref 0–0.9)
MONOCYTES NFR BLD AUTO: 6.4 % — SIGNIFICANT CHANGE UP (ref 2–14)
NEUTROPHILS # BLD AUTO: 5.01 K/UL — SIGNIFICANT CHANGE UP (ref 1.8–7.4)
NEUTROPHILS NFR BLD AUTO: 61.8 % — SIGNIFICANT CHANGE UP (ref 43–77)
NRBC # BLD: 0 /100 WBCS — SIGNIFICANT CHANGE UP (ref 0–0)
PLATELET # BLD AUTO: 232 K/UL — SIGNIFICANT CHANGE UP (ref 150–400)
POTASSIUM SERPL-MCNC: 3.7 MMOL/L — SIGNIFICANT CHANGE UP (ref 3.5–5.3)
POTASSIUM SERPL-SCNC: 3.7 MMOL/L — SIGNIFICANT CHANGE UP (ref 3.5–5.3)
PROT SERPL-MCNC: 7.6 GM/DL — SIGNIFICANT CHANGE UP (ref 6–8.3)
RBC # BLD: 4.53 M/UL — SIGNIFICANT CHANGE UP (ref 3.8–5.2)
RBC # FLD: 13.9 % — SIGNIFICANT CHANGE UP (ref 10.3–14.5)
SODIUM SERPL-SCNC: 140 MMOL/L — SIGNIFICANT CHANGE UP (ref 135–145)
TROPONIN I, HIGH SENSITIVITY RESULT: 4.4 NG/L — SIGNIFICANT CHANGE UP
WBC # BLD: 8.12 K/UL — SIGNIFICANT CHANGE UP (ref 3.8–10.5)
WBC # FLD AUTO: 8.12 K/UL — SIGNIFICANT CHANGE UP (ref 3.8–10.5)

## 2024-09-26 PROCEDURE — 99285 EMERGENCY DEPT VISIT HI MDM: CPT

## 2024-09-26 PROCEDURE — 71045 X-RAY EXAM CHEST 1 VIEW: CPT | Mod: 26

## 2024-09-26 RX ORDER — KETOROLAC TROMETHAMINE 30 MG/ML
30 INJECTION, SOLUTION INTRAMUSCULAR ONCE
Refills: 0 | Status: DISCONTINUED | OUTPATIENT
Start: 2024-09-26 | End: 2024-09-26

## 2024-09-26 RX ADMIN — KETOROLAC TROMETHAMINE 30 MILLIGRAM(S): 30 INJECTION, SOLUTION INTRAMUSCULAR at 22:02

## 2024-09-26 NOTE — ED ADULT TRIAGE NOTE - CHIEF COMPLAINT QUOTE
Pt c/o Lower back pain , dizziness and SOB on exertion x yesterday. pt states she fell of ladder on 8/28, and since then back pain never went away, something she lose feelings in both legs .

## 2024-09-26 NOTE — ED ADULT TRIAGE NOTE - LANGUAGE ASSISTANCE NEEDED
SPEAKS AND understands some english/No-Patient/Caregiver offered and refused free interpretation services.

## 2024-09-26 NOTE — ED PROVIDER NOTE - NSFOLLOWUPINSTRUCTIONS_ED_ALL_ED_FT
Rest, drink plenty of fluids  Advance activity as tolerated  Continue all previously prescribed medications as directed  Follow up with your PMD - bring copies of your results  Return to the ER for chest pain, difficulty breathing, worsening symptoms, numbness, weakness, or other new or concerning symptoms

## 2024-09-26 NOTE — ED PROVIDER NOTE - PATIENT PORTAL LINK FT
You can access the FollowMyHealth Patient Portal offered by [facility] by registering at the following website: [email or web address]. By joining [facility]’s FollowMyHealth portal, you will also be able to view your health information using other applications (apps) compatible with our system.

## 2024-09-26 NOTE — ED ADULT NURSE NOTE - OBJECTIVE STATEMENT
Pt AOx4, responsive, and speaking in full sentence, ambulatory. Pt c/o headache, lower back pain w hx of L1 fracture from August 2024, SOB starting yesterday, worsens with movement. Denies CP/fever/chills, n/v/d,  symptoms. NKDA. PMH none. endorses 3 daily cigarette smoking. Pt AOx4, responsive, and speaking in full sentence, ambulatory. Pt c/o headache, lower back pain w hx of L1 fracture from August 2024, SOB starting yesterday, worsens with movement. also states BLE cramping x 1 week. Pt in back brace. Denies CP/fever/chills, n/v/d,  symptoms. NKDA. PMH none. endorses 3 daily cigarette smoking.

## 2024-09-26 NOTE — ED PROVIDER NOTE - CLINICAL SUMMARY MEDICAL DECISION MAKING FREE TEXT BOX
52yo female with no pmh presenting with back pain and exertional dyspnea.  8/28 had fall off ladder and found to have fx of L1.  currently in brace.  States pain has been persistent despite ibuprofen. 54yo female with no pmh presenting with back pain and exertional dyspnea.  8/28 had fall off ladder and found to have fx of L1.  currently in brace.  States pain has been persistent despite ibuprofen.  Occasional paresthesias to the b/l legs, not now and no numbness, weakness, bowel/ bladder dysfunction, fevers.  States over past 3 days has noticed exertional dyspnea, uncertain if it is related to the pain.  Feels palpitations when this is happening.  Feels fine now.  No cp, cough, fever, edema, hx dvt/pe, ocp use.  ECG nsr, nonischemic.  Will get labs eval acs, pe, xr eval pna.  Medicate for pain, reassess.

## 2024-09-26 NOTE — ED ADULT NURSE NOTE - NSFALLRISKINTERV_ED_ALL_ED

## 2024-09-26 NOTE — ED ADULT NURSE NOTE - NSFALLCONCLUSION_ED_ALL_ED
Fall Risk [de-identified] : CC left Hand\par \par HPI 61 yo female right HD presents with cast FU acute onset of one week of activity-related pain in the dorsal left wrist after a slip and fall. The pain is same, and rated a 6 out of 10, described as robbing, [without radiation]. Rest makes the pain better and wrist motion makes the pain worse. The patient reports associated symptoms of swelling. The patient - pain at night affecting sleep, - neck pain, and - similar pain previously.\par \par The patient has tried the following treatments:\par Activity modification	+\par Ice			+\par Nsaids    		+\par Physical Therapy  	-\par Cortisone Injection	-\par Arthroscopy/Surgery	-\par \par no pain, cast intact\par \par Review of Systems is positive for the above musculoskeletal symptoms and is otherwise non-contributory for general, constitutional, psychiatric, neurologic, HEENT, cardiac, respiratory, gastrointestinal, reproductive, lymphatic, and dermatologic complaints.\par \par Consult by Dr Gabriela Chapa

## 2024-09-26 NOTE — ED PROVIDER NOTE - PHYSICAL EXAMINATION
General appearance: Nontoxic appearing, conversant, afebrile    Eyes: anicteric sclerae, XIN, EOMI   HENT: Atraumatic; oropharynx clear, MMM and no ulcerations, no pharyngeal erythema or exudate   Neck: Trachea midline; Full range of motion, supple   Pulm: CTA bl, normal respiratory effort and no intercostal retractions, normal work of breathing   CV: RRR, No murmurs, rubs, or gallops   Abdomen: Soft, non-tender, non-distended; no guarding or rebound   Back: Lower thoracic/ upper lumbar midline ttp, step offs, deformities, no cvat    Extremities: No peripheral edema, no gross deformities, FROM x4, 5/5 MS x4, gross sensation intact    Skin: Dry, normal temperature, turgor and texture; no rash   Psych: Appropriate affect, cooperative

## 2024-09-27 RX ORDER — LIDOCAINE/BENZALKONIUM/ALCOHOL
1 SOLUTION, NON-ORAL TOPICAL
Qty: 2 | Refills: 0
Start: 2024-09-27

## 2024-09-27 RX ORDER — ACETAMINOPHEN 325 MG/1
2 TABLET ORAL
Qty: 56 | Refills: 0
Start: 2024-09-27 | End: 2024-10-03

## 2024-09-27 RX ORDER — AMOXICILLIN 500 MG
2 CAPSULE ORAL
Qty: 30 | Refills: 0
Start: 2024-09-27 | End: 2024-10-01

## 2024-10-10 ENCOUNTER — NON-APPOINTMENT (OUTPATIENT)
Age: 54
End: 2024-10-10

## 2024-10-10 ENCOUNTER — APPOINTMENT (OUTPATIENT)
Dept: ORTHOPEDIC SURGERY | Facility: CLINIC | Age: 54
End: 2024-10-10
Payer: MEDICAID

## 2024-10-10 VITALS
HEIGHT: 67 IN | SYSTOLIC BLOOD PRESSURE: 108 MMHG | HEART RATE: 87 BPM | OXYGEN SATURATION: 98 % | BODY MASS INDEX: 26.06 KG/M2 | DIASTOLIC BLOOD PRESSURE: 73 MMHG | WEIGHT: 166 LBS

## 2024-10-10 DIAGNOSIS — S32.009A UNSPECIFIED FRACTURE OF UNSPECIFIED LUMBAR VERTEBRA, INITIAL ENCOUNTER FOR CLOSED FRACTURE: ICD-10-CM

## 2024-10-10 PROCEDURE — 99214 OFFICE O/P EST MOD 30 MIN: CPT

## 2024-10-10 PROCEDURE — 72100 X-RAY EXAM L-S SPINE 2/3 VWS: CPT

## 2024-11-11 ENCOUNTER — APPOINTMENT (OUTPATIENT)
Dept: ORTHOPEDIC SURGERY | Facility: CLINIC | Age: 54
End: 2024-11-11
Payer: MEDICAID

## 2024-11-11 VITALS
BODY MASS INDEX: 26.37 KG/M2 | SYSTOLIC BLOOD PRESSURE: 92 MMHG | DIASTOLIC BLOOD PRESSURE: 65 MMHG | WEIGHT: 168 LBS | HEIGHT: 67 IN | HEART RATE: 103 BPM | OXYGEN SATURATION: 99 %

## 2024-11-11 DIAGNOSIS — S32.009A UNSPECIFIED FRACTURE OF UNSPECIFIED LUMBAR VERTEBRA, INITIAL ENCOUNTER FOR CLOSED FRACTURE: ICD-10-CM

## 2024-11-11 PROCEDURE — 99214 OFFICE O/P EST MOD 30 MIN: CPT

## 2024-11-19 ENCOUNTER — OUTPATIENT (OUTPATIENT)
Dept: OUTPATIENT SERVICES | Facility: HOSPITAL | Age: 54
LOS: 1 days | End: 2024-11-19
Payer: MEDICAID

## 2024-11-19 ENCOUNTER — APPOINTMENT (OUTPATIENT)
Dept: MRI IMAGING | Facility: CLINIC | Age: 54
End: 2024-11-19
Payer: MEDICAID

## 2024-11-19 DIAGNOSIS — Z90.721 ACQUIRED ABSENCE OF OVARIES, UNILATERAL: Chronic | ICD-10-CM

## 2024-11-19 DIAGNOSIS — S32.010A WEDGE COMPRESSION FRACTURE OF FIRST LUMBAR VERTEBRA, INITIAL ENCOUNTER FOR CLOSED FRACTURE: ICD-10-CM

## 2024-11-19 PROCEDURE — 72148 MRI LUMBAR SPINE W/O DYE: CPT | Mod: 26

## 2024-11-19 PROCEDURE — 72148 MRI LUMBAR SPINE W/O DYE: CPT

## 2024-11-19 NOTE — ED ADULT NURSE NOTE - NS ED NURSE LEVEL OF CONSCIOUSNESS SPEECH
Immunization  Immunization performed in left and right deltoid by Basilia Mead MA. Patient tolerated the procedure well without complications.  11/19/24   Basilia Mead MA     Speaking Coherently/Age appropriate

## 2024-11-25 ENCOUNTER — APPOINTMENT (OUTPATIENT)
Dept: ORTHOPEDIC SURGERY | Facility: CLINIC | Age: 54
End: 2024-11-25

## 2025-01-18 ENCOUNTER — EMERGENCY (EMERGENCY)
Facility: HOSPITAL | Age: 55
LOS: 0 days | Discharge: HOME | End: 2025-01-18
Attending: STUDENT IN AN ORGANIZED HEALTH CARE EDUCATION/TRAINING PROGRAM
Payer: MEDICAID

## 2025-01-18 VITALS
HEART RATE: 85 BPM | TEMPERATURE: 98 F | WEIGHT: 167.99 LBS | HEIGHT: 67 IN | DIASTOLIC BLOOD PRESSURE: 62 MMHG | OXYGEN SATURATION: 100 % | SYSTOLIC BLOOD PRESSURE: 117 MMHG | RESPIRATION RATE: 19 BRPM

## 2025-01-18 DIAGNOSIS — Z90.721 ACQUIRED ABSENCE OF OVARIES, UNILATERAL: Chronic | ICD-10-CM

## 2025-01-18 DIAGNOSIS — M65.312 TRIGGER THUMB, LEFT THUMB: ICD-10-CM

## 2025-01-18 DIAGNOSIS — G56.01 CARPAL TUNNEL SYNDROME, RIGHT UPPER LIMB: ICD-10-CM

## 2025-01-18 PROCEDURE — 73130 X-RAY EXAM OF HAND: CPT | Mod: 26,LT

## 2025-01-18 PROCEDURE — 99284 EMERGENCY DEPT VISIT MOD MDM: CPT

## 2025-01-18 RX ORDER — IBUPROFEN 200 MG
600 TABLET ORAL ONCE
Refills: 0 | Status: COMPLETED | OUTPATIENT
Start: 2025-01-18 | End: 2025-01-18

## 2025-01-18 RX ORDER — ACETAMINOPHEN 500 MG/5ML
975 LIQUID (ML) ORAL ONCE
Refills: 0 | Status: COMPLETED | OUTPATIENT
Start: 2025-01-18 | End: 2025-01-18

## 2025-01-18 RX ADMIN — Medication 600 MILLIGRAM(S): at 18:31

## 2025-01-18 RX ADMIN — Medication 975 MILLIGRAM(S): at 18:31

## 2025-01-23 ENCOUNTER — APPOINTMENT (OUTPATIENT)
Dept: ORTHOPEDIC SURGERY | Facility: CLINIC | Age: 55
End: 2025-01-23
Payer: MEDICAID

## 2025-01-23 VITALS — BODY MASS INDEX: 26.37 KG/M2 | WEIGHT: 168 LBS | HEIGHT: 67 IN

## 2025-01-23 DIAGNOSIS — S32.009A UNSPECIFIED FRACTURE OF UNSPECIFIED LUMBAR VERTEBRA, INITIAL ENCOUNTER FOR CLOSED FRACTURE: ICD-10-CM

## 2025-01-23 PROCEDURE — 99214 OFFICE O/P EST MOD 30 MIN: CPT

## 2025-01-28 ENCOUNTER — APPOINTMENT (OUTPATIENT)
Dept: ORTHOPEDIC SURGERY | Facility: CLINIC | Age: 55
End: 2025-01-28

## 2025-03-03 ENCOUNTER — EMERGENCY (EMERGENCY)
Facility: HOSPITAL | Age: 55
LOS: 0 days | Discharge: HOME | End: 2025-03-03
Payer: MEDICAID

## 2025-03-03 VITALS
WEIGHT: 169.09 LBS | TEMPERATURE: 98 F | DIASTOLIC BLOOD PRESSURE: 79 MMHG | RESPIRATION RATE: 18 BRPM | HEIGHT: 67 IN | OXYGEN SATURATION: 97 % | SYSTOLIC BLOOD PRESSURE: 115 MMHG | HEART RATE: 72 BPM

## 2025-03-03 DIAGNOSIS — Z90.721 ACQUIRED ABSENCE OF OVARIES, UNILATERAL: Chronic | ICD-10-CM

## 2025-03-03 DIAGNOSIS — K08.89 OTHER SPECIFIED DISORDERS OF TEETH AND SUPPORTING STRUCTURES: ICD-10-CM

## 2025-03-03 DIAGNOSIS — K04.7 PERIAPICAL ABSCESS WITHOUT SINUS: ICD-10-CM

## 2025-03-03 PROCEDURE — 99283 EMERGENCY DEPT VISIT LOW MDM: CPT

## 2025-03-03 RX ORDER — ACETAMINOPHEN 500 MG/5ML
650 LIQUID (ML) ORAL ONCE
Refills: 0 | Status: COMPLETED | OUTPATIENT
Start: 2025-03-03 | End: 2025-03-03

## 2025-03-03 RX ORDER — IBUPROFEN 200 MG
600 TABLET ORAL ONCE
Refills: 0 | Status: COMPLETED | OUTPATIENT
Start: 2025-03-03 | End: 2025-03-03

## 2025-03-03 RX ORDER — AMOXICILLIN AND CLAVULANATE POTASSIUM 500; 125 MG/1; MG/1
875 TABLET, FILM COATED ORAL
Qty: 20 | Refills: 0
Start: 2025-03-03 | End: 2025-03-12

## 2025-03-03 RX ORDER — IBUPROFEN 200 MG
1 TABLET ORAL
Qty: 20 | Refills: 0
Start: 2025-03-03 | End: 2025-03-07

## 2025-03-03 RX ORDER — AMOXICILLIN AND CLAVULANATE POTASSIUM 500; 125 MG/1; MG/1
1 TABLET, FILM COATED ORAL ONCE
Refills: 0 | Status: COMPLETED | OUTPATIENT
Start: 2025-03-03 | End: 2025-03-03

## 2025-03-03 RX ADMIN — Medication 650 MILLIGRAM(S): at 11:55

## 2025-03-03 RX ADMIN — Medication 600 MILLIGRAM(S): at 11:55

## 2025-03-03 RX ADMIN — AMOXICILLIN AND CLAVULANATE POTASSIUM 1 TABLET(S): 500; 125 TABLET, FILM COATED ORAL at 11:55

## 2025-03-03 NOTE — ED ADULT NURSE NOTE - IS THE PATIENT ABLE TO BE SCREENED?
Follow Up/Nutrition Services MAR Accept Note/Event Note Neurology transfer note/Event Note Event Note Event Note Event Note Event Note Event Note Follow Up/Nutrition Services IR Transfer Note Yes

## 2025-03-03 NOTE — ED ADULT NURSE NOTE - OBJECTIVE STATEMENT
Pt presents to ED A&Ox4 c/o L upper dental pain x 2 days. Pt reports being seen by dentist  and was informed she has an infection. Pt states she did not receive any medications. Hx of veneers x 3 years. Pt denies fever, chills, N/V, or drainage from area.

## 2025-03-03 NOTE — ED ADULT NURSE NOTE - NSFALLASSESSNEED_ED_ALL_ED
Is This A New Presentation, Or A Follow-Up?: Rash
Additional History: Patient states he has home supply of Clobetasol at home, but has not used it recently.  DVT 3weeks ago and cellulitis
no

## 2025-03-03 NOTE — ED ADULT TRIAGE NOTE - CHIEF COMPLAINT QUOTE
left sided mouth, face  and gum pains, pain to left side of head x 2 days, was told by her dentist that she has a gum infection and the cost of treatment is $3000.

## 2025-03-03 NOTE — ED PROVIDER NOTE - CARE PROVIDER_API CALL
your pmd in 1-3 days,   Phone: (   )    -  Fax: (   )    -  Follow Up Time:     see attached dental clinics,   Phone: (   )    -  Fax: (   )    -  Follow Up Time:

## 2025-03-03 NOTE — ED ADULT NURSE NOTE - NS ED NURSE RECORD ANOTHER VITAL SIGN
After Visit Summary   8/23/2018    Ana Casey    MRN: [id number]           Patient Information     Date Of Birth          2006        Visit Information        Provider Department      8/23/2018 9:30 AM Tera Morin MD [facility]        Today's Diagnoses     Encounter for routine child health examination without abnormal findings    -  1    Ingrown toenail        Need for vaccination           Follow-ups after your visit        Additional Services     PODIATRY/FOOT & ANKLE SURGERY REFERRAL       Your provider has referred you to: [facility]: [facility] [phone]   [email or web address]    Please be aware that coverage of these services is subject to the terms and limitations of your health insurance plan.  Call member services at your health plan with any benefit or coverage questions.      Please bring the following to your appointment:  >>   Any x-rays, CTs or MRIs which have been performed.  Contact the facility where they were done to arrange for  prior to your scheduled appointment.    >>   List of current medications   >>   This referral request   >>   Any documents/labs given to you for this referral                  Your next 10 appointments already scheduled     Aug 30, 2018  1:00 PM CDT   New Visit with Baldev Garcia DPM   [facility] ([facility])    [address] [place] [id number] [phone]            Nov 01, 2018  9:30 AM CDT   (Arrive by 9:15 AM)   Return Visit with Viet Lance MD   [facility] ([facility])    [address] [place] [address] [phone]              Who to contact     If you have questions or need follow up information about today's clinic visit or your schedule please contact [facility] directly at [phone].  Normal or non-critical lab and  "imaging results will be communicated to you by MyChart, letter or phone within 4 business days after the clinic has received the results. If you do not hear from us within 7 days, please contact the clinic through [employer] or phone. If you have a critical or abnormal lab result, we will notify you by phone as soon as possible.  Submit refill requests through [employer] or call your pharmacy and they will forward the refill request to us. Please allow 3 business days for your refill to be completed.          Additional Information About Your Visit        [facility] Information     [employer] gives you secure access to your electronic health record. If you see a primary care provider, you can also send messages to your care team and make appointments. If you have questions, please call your primary care clinic.  If you do not have a primary care provider, please call [phone] and they will assist you.        Care EveryWhere ID     This is your Care EveryWhere ID. This could be used by other organizations to access your [facility] medical records  [id number]        Your Vitals Were     Pulse Temperature Respirations Height Pulse Oximetry Breastfeeding?    108 98.2  F (36.8  C) (Temporal) 16 5' 0.5\" (1.537 m) 98% No    BMI (Body Mass Index)                   26.89 kg/m2            Blood Pressure from Last 3 Encounters:   08/23/18 114/72   04/09/18 108/66   03/29/18 106/64    Weight from Last 3 Encounters:   08/23/18 140 lb (63.5 kg) (93 %)*   05/15/18 130 lb (59 kg) (90 %)*   04/09/18 125 lb (56.7 kg) (88 %)*     * Growth percentiles are based on CDC 2-20 Years data.              We Performed the Following     1st  Administration  [55356]     Each additional admin.  (Right click and add QUANTITY)  [[id number]]     MENINGOCOCCAL VACCINE,IM (MENACTRA) [25826] AGE 11-55     PODIATRY/FOOT & ANKLE SURGERY REFERRAL     SCREENING QUESTIONS FOR PED IMMUNIZATIONS     TDAP VACCINE (ADACEL) [12820.002]        Primary Care Provider Office " Phone # Fax #    Tera Morin [phone] [address] [place] [address]        Equal Access to Services     MANDY LEWIS : Benigno osei barneynarda Sosurekhaali, waaxda luqadaha, qaybta kaalmada dominic, tanner abdul michaeltonio tolliver laJorgehannah thomas. So [facility] [phone].    ATENCIÓN: Si habla español, tiene a crespo disposición servicios gratuitos de asistencia lingüística. Llame al [phone].    We comply with applicable federal civil rights laws and [place] laws. We do not discriminate on the basis of race, color, national origin, age, disability, sex, sexual orientation, or gender identity.            Thank you!     Thank you for choosing [facility]  for your care. Our goal is always to provide you with excellent care. Hearing back from our patients is one way we can continue to improve our services. Please take a few minutes to complete the written survey that you may receive in the mail after your visit with us. Thank you!             Your Updated Medication List - Protect others around you: Learn how to safely use, store and throw away your medicines at www.disposemymeds.org.          This list is accurate as of 8/23/18 12:23 PM.  Always use your most recent med list.                   Brand Name Dispense Instructions for use Diagnosis    CHILDRENS MULTIVITAMIN PO      Take  by mouth.        FIBER PO      gummies    Throat pain       PROBIOTIC DAILY PO      CHEWABLE           Yes

## 2025-03-03 NOTE — ED PROVIDER NOTE - PHYSICAL EXAMINATION
PHYSICAL EXAM:    GENERAL: Alert, appears stated age, well appearing, non-toxic  SKIN: Warm and dry. MMM.   HEAD: NC, AT   EYE: Normal lids/conjunctiva  ENT: Normal hearing, patent oropharynx without erythema or exudate, TMs clear b/l. uvula midline visibile gingiva pink and healthy appearing, but there is tenderness to percussion of tooth and gingiva around #24/25. no trismus/drooling. veneers in place. no facial swelling. no other ttp.   NECK: +supple. No meningismus, or JVD  Pulm: Bilateral BS, normal resp effort, no wheezes, stridor, or retractions  CV: RRR, no M/R/G, 2+and = radial pulses  Mskel: no erythema, cyanosis, edema. no calf tenderness  Neuro: AAOx3, normal gait.

## 2025-03-03 NOTE — ED PROVIDER NOTE - PROVIDER TOKENS
FREE:[LAST:[your pmd in 1-3 days],PHONE:[(   )    -],FAX:[(   )    -]],FREE:[LAST:[see attached dental clinics],PHONE:[(   )    -],FAX:[(   )    -]]

## 2025-03-03 NOTE — ED PROVIDER NOTE - CLINICAL SUMMARY MEDICAL DECISION MAKING FREE TEXT BOX
54-year-old female without PMH, has veneers in place x 3 years presents with L upper dental pain/gum pain x 2 days presents with persistent pain to this area.  She went to see her dentist here who told her he would charge her $3000 for this which she is unable to pay and so she presented here for second opinion.  She was not given any pain medication/antibiotics.  She relates that she prefers to follow-up with a specialist who did her veneers [place], and she has a flight there in 1 week.  No fever, sweats, chills, nausea, vomiting, cough, congestion, ear pain, trismus, discharge, foul odor, voice change, decreased po fluids.   exam as above  ddx includes: dental infection, gingival infection, dental abscess,   on exam, visibile gingiva pink and healthy appearing, but there is tenderness to percussion of tooth and gingiva around #24/25. no trismus/drooling. veneers in place.   no lad.  will give abx/pain medication, dc to f.u with dental--given info on dental clinics or can follow up with her veneer specialist in1 wk. counseled on red flags and to return for them.

## 2025-03-03 NOTE — ED PROVIDER NOTE - PATIENT PORTAL LINK FT
You can access the FollowMyHealth Patient Portal offered by [facility] by registering at the following website: [email or web address]. By joining [facility]’s FollowMyHealth portal, you will also be able to view your health information using other applications (apps) compatible with our system.

## 2025-03-06 NOTE — ED PROVIDER NOTE - NS_EDPROVIDERDISPOUSERTYPE_ED_A_ED
Detail Level: Detailed Detail Level: Zone Scribe Attestation (For Scribes USE Only)... Attending Attestation (For Attendings USE Only).../Scribe Attestation (For Scribes USE Only)...
